# Patient Record
Sex: MALE | Race: WHITE | Employment: UNEMPLOYED | URBAN - METROPOLITAN AREA
[De-identification: names, ages, dates, MRNs, and addresses within clinical notes are randomized per-mention and may not be internally consistent; named-entity substitution may affect disease eponyms.]

---

## 2019-08-04 ENCOUNTER — APPOINTMENT (OUTPATIENT)
Dept: GENERAL RADIOLOGY | Age: 44
End: 2019-08-04

## 2019-08-04 ENCOUNTER — HOSPITAL ENCOUNTER (EMERGENCY)
Age: 44
Discharge: HOME | End: 2019-08-05
Attending: EMERGENCY MEDICINE

## 2019-08-04 VITALS
HEIGHT: 74 IN | WEIGHT: 225 LBS | HEART RATE: 106 BPM | BODY MASS INDEX: 28.88 KG/M2 | TEMPERATURE: 98 F | DIASTOLIC BLOOD PRESSURE: 102 MMHG | OXYGEN SATURATION: 100 % | SYSTOLIC BLOOD PRESSURE: 138 MMHG | RESPIRATION RATE: 18 BRPM

## 2019-08-04 DIAGNOSIS — M62.838 SPASM OF MUSCLE: ICD-10-CM

## 2019-08-04 DIAGNOSIS — M25.511 ACUTE PAIN OF RIGHT SHOULDER: Primary | ICD-10-CM

## 2019-08-04 PROCEDURE — 73060 X-RAY EXAM OF HUMERUS: CPT

## 2019-08-04 PROCEDURE — 73030 X-RAY EXAM OF SHOULDER: CPT

## 2019-08-04 PROCEDURE — 99283 EMERGENCY DEPT VISIT LOW MDM: CPT

## 2019-08-04 PROCEDURE — 6370000000 HC RX 637 (ALT 250 FOR IP): Performed by: EMERGENCY MEDICINE

## 2019-08-04 RX ORDER — ACETAMINOPHEN 500 MG
1000 TABLET ORAL ONCE
Status: COMPLETED | OUTPATIENT
Start: 2019-08-04 | End: 2019-08-04

## 2019-08-04 RX ADMIN — ACETAMINOPHEN 1000 MG: 500 TABLET ORAL at 23:38

## 2019-08-04 SDOH — HEALTH STABILITY: MENTAL HEALTH: HOW OFTEN DO YOU HAVE A DRINK CONTAINING ALCOHOL?: NEVER

## 2019-08-04 ASSESSMENT — PAIN DESCRIPTION - LOCATION: LOCATION: SHOULDER

## 2019-08-04 ASSESSMENT — PAIN SCALES - GENERAL: PAINLEVEL_OUTOF10: 8

## 2019-08-04 ASSESSMENT — PAIN DESCRIPTION - PAIN TYPE: TYPE: ACUTE PAIN

## 2019-08-04 ASSESSMENT — PAIN DESCRIPTION - DESCRIPTORS: DESCRIPTORS: BURNING

## 2019-08-04 ASSESSMENT — PAIN DESCRIPTION - ORIENTATION: ORIENTATION: RIGHT

## 2019-08-04 NOTE — LETTER
[address] [facility]  [facility] [address]  Phone: [phone]               August 5, 2019    Patient: Duane Copper   YOB: 1975   Date of Visit: 8/4/2019       To Whom It May Concern:    Gera Castillo was seen and treated in our emergency department on 8/4/2019. He may return to work on 8/7/19.       Sincerely,       Lauren Alejandro MD         Signature:__________________________________

## 2019-08-05 PROCEDURE — 6370000000 HC RX 637 (ALT 250 FOR IP): Performed by: EMERGENCY MEDICINE

## 2019-08-05 RX ORDER — LIDOCAINE 50 MG/G
1 PATCH TOPICAL DAILY PRN
Qty: 14 PATCH | Refills: 0 | Status: SHIPPED | OUTPATIENT
Start: 2019-08-05 | End: 2019-08-19

## 2019-08-05 RX ORDER — CYCLOBENZAPRINE HCL 10 MG
10 TABLET ORAL 3 TIMES DAILY PRN
Qty: 30 TABLET | Refills: 0 | Status: SHIPPED | OUTPATIENT
Start: 2019-08-05 | End: 2019-08-15

## 2019-08-05 RX ORDER — CYCLOBENZAPRINE HCL 10 MG
10 TABLET ORAL ONCE
Status: COMPLETED | OUTPATIENT
Start: 2019-08-05 | End: 2019-08-05

## 2019-08-05 RX ADMIN — CYCLOBENZAPRINE HYDROCHLORIDE 10 MG: 10 TABLET, FILM COATED ORAL at 00:05

## 2021-03-05 ENCOUNTER — HOSPITAL ENCOUNTER (EMERGENCY)
Age: 46
Discharge: HOME | End: 2021-03-05
Attending: EMERGENCY MEDICINE
Payer: COMMERCIAL

## 2021-03-05 VITALS
DIASTOLIC BLOOD PRESSURE: 97 MMHG | WEIGHT: 228 LBS | OXYGEN SATURATION: 99 % | HEART RATE: 92 BPM | TEMPERATURE: 98.4 F | RESPIRATION RATE: 18 BRPM | BODY MASS INDEX: 29.26 KG/M2 | HEIGHT: 74 IN | SYSTOLIC BLOOD PRESSURE: 145 MMHG

## 2021-03-05 DIAGNOSIS — R11.2 NAUSEA AND VOMITING, INTRACTABILITY OF VOMITING NOT SPECIFIED, UNSPECIFIED VOMITING TYPE: Primary | ICD-10-CM

## 2021-03-05 LAB
A/G RATIO: 1.3 (ref 1.1–2.2)
ALBUMIN SERPL-MCNC: 3.9 G/DL (ref 3.4–5)
ALP BLD-CCNC: 86 U/L (ref 40–129)
ALT SERPL-CCNC: 38 U/L (ref 10–40)
ANION GAP SERPL CALCULATED.3IONS-SCNC: 8 MMOL/L (ref 3–16)
AST SERPL-CCNC: 39 U/L (ref 15–37)
BASOPHILS ABSOLUTE: 0 K/UL (ref 0–0.2)
BASOPHILS RELATIVE PERCENT: 0.5 %
BILIRUB SERPL-MCNC: 0.4 MG/DL (ref 0–1)
BILIRUBIN URINE: NEGATIVE
BLOOD, URINE: NEGATIVE
BUN BLDV-MCNC: 10 MG/DL (ref 7–20)
CALCIUM SERPL-MCNC: 9.4 MG/DL (ref 8.3–10.6)
CHLORIDE BLD-SCNC: 103 MMOL/L (ref 99–110)
CLARITY: CLEAR
CO2: 28 MMOL/L (ref 21–32)
COLOR: YELLOW
CREAT SERPL-MCNC: 0.8 MG/DL (ref 0.9–1.3)
EOSINOPHILS ABSOLUTE: 0.1 K/UL (ref 0–0.6)
EOSINOPHILS RELATIVE PERCENT: 1.9 %
GFR AFRICAN AMERICAN: >60
GFR NON-AFRICAN AMERICAN: >60
GLOBULIN: 3 G/DL
GLUCOSE BLD-MCNC: 118 MG/DL (ref 70–99)
GLUCOSE URINE: NEGATIVE MG/DL
HCT VFR BLD CALC: 42.5 % (ref 40.5–52.5)
HEMOGLOBIN: 14.5 G/DL (ref 13.5–17.5)
KETONES, URINE: NEGATIVE MG/DL
LEUKOCYTE ESTERASE, URINE: NEGATIVE
LIPASE: 36 U/L (ref 13–60)
LYMPHOCYTES ABSOLUTE: 2.1 K/UL (ref 1–5.1)
LYMPHOCYTES RELATIVE PERCENT: 33.9 %
MCH RBC QN AUTO: 29.7 PG (ref 26–34)
MCHC RBC AUTO-ENTMCNC: 34.1 G/DL (ref 31–36)
MCV RBC AUTO: 87.1 FL (ref 80–100)
MICROSCOPIC EXAMINATION: NORMAL
MONOCYTES ABSOLUTE: 0.6 K/UL (ref 0–1.3)
MONOCYTES RELATIVE PERCENT: 9.2 %
NEUTROPHILS ABSOLUTE: 3.4 K/UL (ref 1.7–7.7)
NEUTROPHILS RELATIVE PERCENT: 54.5 %
NITRITE, URINE: NEGATIVE
PDW BLD-RTO: 13.6 % (ref 12.4–15.4)
PH UA: 7.5 (ref 5–8)
PLATELET # BLD: 246 K/UL (ref 135–450)
PMV BLD AUTO: 7.8 FL (ref 5–10.5)
POTASSIUM REFLEX MAGNESIUM: 3.8 MMOL/L (ref 3.5–5.1)
PROTEIN UA: NEGATIVE MG/DL
RBC # BLD: 4.87 M/UL (ref 4.2–5.9)
SODIUM BLD-SCNC: 139 MMOL/L (ref 136–145)
SPECIFIC GRAVITY UA: 1.01 (ref 1–1.03)
TOTAL PROTEIN: 6.9 G/DL (ref 6.4–8.2)
URINE TYPE: NORMAL
UROBILINOGEN, URINE: 0.2 E.U./DL
WBC # BLD: 6.3 K/UL (ref 4–11)

## 2021-03-05 PROCEDURE — 99284 EMERGENCY DEPT VISIT MOD MDM: CPT

## 2021-03-05 PROCEDURE — 2580000003 HC RX 258: Performed by: EMERGENCY MEDICINE

## 2021-03-05 PROCEDURE — 80053 COMPREHEN METABOLIC PANEL: CPT

## 2021-03-05 PROCEDURE — 81003 URINALYSIS AUTO W/O SCOPE: CPT

## 2021-03-05 PROCEDURE — 96374 THER/PROPH/DIAG INJ IV PUSH: CPT

## 2021-03-05 PROCEDURE — 83690 ASSAY OF LIPASE: CPT

## 2021-03-05 PROCEDURE — 85025 COMPLETE CBC W/AUTO DIFF WBC: CPT

## 2021-03-05 PROCEDURE — 6360000002 HC RX W HCPCS: Performed by: EMERGENCY MEDICINE

## 2021-03-05 PROCEDURE — 36415 COLL VENOUS BLD VENIPUNCTURE: CPT

## 2021-03-05 RX ORDER — ONDANSETRON 2 MG/ML
4 INJECTION INTRAMUSCULAR; INTRAVENOUS ONCE
Status: COMPLETED | OUTPATIENT
Start: 2021-03-05 | End: 2021-03-05

## 2021-03-05 RX ORDER — ONDANSETRON 4 MG/1
4 TABLET, ORALLY DISINTEGRATING ORAL EVERY 8 HOURS PRN
Qty: 20 TABLET | Refills: 0 | Status: SHIPPED | OUTPATIENT
Start: 2021-03-05 | End: 2021-03-12

## 2021-03-05 RX ORDER — 0.9 % SODIUM CHLORIDE 0.9 %
1000 INTRAVENOUS SOLUTION INTRAVENOUS ONCE
Status: COMPLETED | OUTPATIENT
Start: 2021-03-05 | End: 2021-03-05

## 2021-03-05 RX ADMIN — SODIUM CHLORIDE 1000 ML: 9 INJECTION, SOLUTION INTRAVENOUS at 20:18

## 2021-03-05 RX ADMIN — ONDANSETRON HYDROCHLORIDE 4 MG: 2 INJECTION, SOLUTION INTRAMUSCULAR; INTRAVENOUS at 20:18

## 2021-03-05 ASSESSMENT — PAIN DESCRIPTION - DESCRIPTORS
DESCRIPTORS: ACHING;BURNING
DESCRIPTORS: BURNING

## 2021-03-05 ASSESSMENT — PAIN DESCRIPTION - LOCATION: LOCATION: ABDOMEN

## 2021-03-05 ASSESSMENT — PAIN DESCRIPTION - PAIN TYPE: TYPE: ACUTE PAIN

## 2021-03-05 NOTE — LETTER
[address] [facility]  [facility] [address]  Phone: [phone]         March 5, 2021     Patient: Haile Davidson   YOB: 1975   Date of Visit: 3/5/2021       To Whom It May Concern:    Carmen Feliciano was seen and treated in our emergency department on 3/5/2021. The following work duties are recommended. He may return to full duty immediately with no restrictions    Treatment and work recommendations given by the emergency department are initial emergency measures only, and follow up should be arranged as soon as possible with the company's occupational health provider* or the specialist to whom the worker was referred.     *If the company does not have an occupational health provider, follow up should be at With your primary care physician if you do not have 1 please get 1      As needed          Sincerely,        Jessica MARIE, RN        Signature:__________________________________

## 2021-03-06 NOTE — DISCHARGE INSTR - COC
Conduit: {YES / XP:03984}       Date of Last BM: ***  No intake or output data in the 24 hours ending 21 2244  No intake/output data recorded.     Safety Concerns:     [address] KOO Safety Concerns:881361674}    Impairments/Disabilities:      [address] Nunez HANANE Impairments/Disabilities:918317345}    Nutrition Therapy:  Current Nutrition Therapy:   [address] Nunez [facility] Diet List:054422985}    Routes of Feeding: {CHP DME Other Feedings:331248156}  Liquids: {Slp liquid thickness:07951}  Daily Fluid Restriction: {CHP DME Yes amt example:994435431}  Last Modified Barium Swallow with Video (Video Swallowing Test): {Done Not Done ZPSO:078763893}    Treatments at the Time of Hospital Discharge:   Respiratory Treatments: ***  Oxygen Therapy:  {Therapy; copd oxygen:98708}  Ventilator:    {MH CC Vent FCDD:433460849}    Rehab Therapies: {THERAPEUTIC INTERVENTION:6772437641}  Weight Bearing Status/Restrictions: [facility] Anabel Nunez  Weight Bearin}  Other Medical Equipment (for information only, NOT a DME order):  {EQUIPMENT:579766301}  Other Treatments: ***    Patient's personal belongings (please select all that are sent with patient):  {[facility] DME Belongings:357889955}    RN SIGNATURE:  {Esignature:242262907}    CASE MANAGEMENT/SOCIAL WORK SECTION    Inpatient Status Date: ***    Readmission Risk Assessment Score:  Readmission Risk              Risk of Unplanned Readmission:        0           Discharging to Facility/ Agency   · Name:   · Address:  · Phone:  · Fax:    Dialysis Facility (if applicable)   · Name:  · Address:  · Dialysis Schedule:  · Phone:  · Fax:    / signature: {Esignature:140430048}    PHYSICIAN SECTION    Prognosis: {Prognosis:5396120798}    Condition at Discharge: 50Grzegorz Nunez Patient Condition:086382074}    Rehab Potential (if transferring to Rehab): {Prognosis:7678918590}    Recommended Labs or Other Treatments After Discharge: ***    Physician Certification: I certify the above information and transfer of Vega Phlegm

## 2021-03-06 NOTE — ED PROVIDER NOTES
Emergency Department Attending Note    Lora Arteaga MD    Date of ED VIsit: 3/5/2021    CHIEF COMPLAINT  Nausea (N/V and fever for a few days. )      HISTORY OF PRESENT ILLNESS  Olegario Marie is a 42 y.o. male  With Vital signs of BP (!) 159/103   Pulse 108   Temp 98.3 °F (36.8 °C) (Oral)   Resp 16   Ht 6' 2\" (1.88 m)   Wt 228 lb (103.4 kg)   SpO2 99%   BMI 29.27 kg/m²  who presents to the ED with a complaint of nausea and vomiting x2 days. Patient seen and evaluated in room 4. Patient states that everything he eats he vomits. Over the past couple of days. There is been no sick contacts. He said he is also had a fever up to 101. There is been no diarrhea no blood in his stool no blood in his vomitus. No abdominal pain. No urinary tract symptoms. No chest pain no shortness of breath no neurologic symptoms. .  No other complaints, modifying factors or associated symptoms. Patients Past medical history reviewed and listed below  Past Medical History:   Diagnosis Date    Murmur      Past Surgical History:   Procedure Laterality Date    OTHER SURGICAL HISTORY      \"open heart surgery as a kid\"        I have reviewed the following from the nursing documentation. History reviewed. No pertinent family history.   Social History     Socioeconomic History    Marital status:      Spouse name: Not on file    Number of children: Not on file    Years of education: Not on file    Highest education level: Not on file   Occupational History    Not on file   Social Needs    Financial resource strain: Not on file    Food insecurity     Worry: Not on file     Inability: Not on file    Transportation needs     Medical: Not on file     Non-medical: Not on file   Tobacco Use    Smoking status: Current Every Day Smoker     Packs/day: 2.00     Types: Cigarettes    Smokeless tobacco: Never Used   Substance and Sexual Activity    Alcohol use: Never     Frequency: Never    Drug use: Never    Sexual activity: Not on file   Lifestyle    Physical activity     Days per week: Not on file     Minutes per session: Not on file    Stress: Not on file   Relationships    Social connections     Talks on phone: Not on file     Gets together: Not on file     Attends [employer]an service: Not on file     Active member of club or organization: Not on file     Attends meetings of clubs or organizations: Not on file     Relationship status: Not on file    Intimate partner violence     Fear of current or ex partner: Not on file     Emotionally abused: Not on file     Physically abused: Not on file     Forced sexual activity: Not on file   Other Topics Concern    Not on file   Social History Narrative    Not on file     No current facility-administered medications for this encounter. No current outpatient medications on file. Allergies   Allergen Reactions    Ibuprofen      \"stop breathing\"        REVIEW OF SYSTEMS  10 systems reviewed, pertinent positives per HPI otherwise noted to be negative     PHYSICAL EXAM  BP (!) 159/103   Pulse 108   Temp 98.3 °F (36.8 °C) (Oral)   Resp 16   Ht 6' 2\" (1.88 m)   Wt 228 lb (103.4 kg)   SpO2 99%   BMI 29.27 kg/m²   GENERAL APPEARANCE: Awake and alert. Cooperative. In no obvious distress. HEAD: Normocephalic. Atraumatic. EYES: PERRL. EOM's grossly intact. ENT: Mucous membranes are pink and moist.   NECK: Supple. HEART: RRR. No murmurs. LUNGS: Respirations unlabored. CTAB. Good air exchange. ABDOMEN: Soft. Non-distended. Non-tender. No masses. No organomegaly. No guarding or rebound. No point tenderness on abdominal exam  EXTREMITIES: No peripheral edema. Moves all extremities equally. All extremities neurovascularly intact. SKIN: Warm and dry. No acute rashes. NEUROLOGICAL: Alert and oriented. Strength 5/5, sensation intact. Gait normal.   PSYCHIATRIC: Normal mood and affect. No HI or SI expressed to me.     RADIOLOGY    If acquired see below     EKG: If acquired see below       ED COURSE/MDM        ED Course as of Mar 05 2208   Fri Mar 05, 2021   2034 CBC was essentially normal with a white count of 6.3 and H&H of 14 and 42 and a platelet count of 164   CBC Auto Differential:    WBC 6.3   RBC 4.87   Hemoglobin Quant 14.5   Hematocrit 42.5   MCV 87.1   MCH 29.7   MCHC 34.1   RDW 13.6   Platelet Count 985   MPV 7.8   Neutrophils % 54.5   Lymphocyte % 33.9   Monocytes % 9.2   Eosinophils % 1.9   Basophils % 0.5   Neutrophils Absolute 3.4   Lymphocytes Absolute 2.1   Monocytes Absolute 0.6   Eosinophils Absolute 0.1   Basophils Absolute 0.0 [DL]   2054 Comprehensive metabolic panel was normal except for glucose of 118. His GFR was greater than 60   Comprehensive Metabolic Panel w/ Reflex to MG(!):    Sodium 139   Potassium 3.8   Chloride 103   CO2 28   Anion Gap 8   Glucose 118(!)   BUN 10   Creatinine 0.8(!)   GFR Non- >60   GFR African American >60   Calcium 9.4   Total Protein 6.9   Albumin 3.9   Albumin/Globulin Ratio 1.3   Bilirubin 0.4   Alk Phos 86   ALT 38   AST 39(!)   Globulin 3.0 [DL]   2111 Lipase was normal at 36   Lipase:    Lipase 36.0 [DL]   2207 Patient's urinalysis was normal with negative ketones negative nitrite negative leukocyte esterase. So is not dehydrated. He has been rehydrated feels better so he will be discharged.    Urinalysis, reflex to microscopic:    Color, UA Yellow   Clarity, UA Clear   Glucose, UA Negative   Bilirubin, Urine Negative   Ketones, Urine Negative   Specific Gravity, UA 1.015   Blood, Urine Negative   pH, UA 7.5   Protein, UA Negative   Urobilinogen, Urine 0.2   Nitrite, Urine Negative   Leukocyte Esterase, Urine Negative   Microscopic Examination Not Indicated   Urine Type NotGiven [DL]      ED Course User Index  [DL] Alyce Bella MD       The ED course and plan were reviewed and results discussed with the patient    The patient understood and agreed with the Discharge/transfer

## 2021-03-06 NOTE — ED NOTES
Pt is alert and oriented, stable for discharge to home,    Pt received printed and verbal instructions for self care at home    Pain rate still 6 per pt, but no more episodes of nausea or vomiting since receiving zofran          Jim Karl, DALE  03/05/21 9075

## 2022-01-28 ENCOUNTER — HOSPITAL ENCOUNTER (OUTPATIENT)
Age: 47
Setting detail: OBSERVATION
Discharge: HOME | End: 2022-02-01
Attending: STUDENT IN AN ORGANIZED HEALTH CARE EDUCATION/TRAINING PROGRAM | Admitting: INTERNAL MEDICINE
Payer: COMMERCIAL

## 2022-01-28 ENCOUNTER — APPOINTMENT (OUTPATIENT)
Dept: GENERAL RADIOLOGY | Age: 47
End: 2022-01-28
Payer: COMMERCIAL

## 2022-01-28 ENCOUNTER — APPOINTMENT (OUTPATIENT)
Dept: CT IMAGING | Age: 47
End: 2022-01-28
Payer: COMMERCIAL

## 2022-01-28 DIAGNOSIS — R07.9 CHEST PAIN, UNSPECIFIED TYPE: Primary | ICD-10-CM

## 2022-01-28 DIAGNOSIS — I47.1 SVT (SUPRAVENTRICULAR TACHYCARDIA) (HCC): ICD-10-CM

## 2022-01-28 LAB
A/G RATIO: 1.7 (ref 1.1–2.2)
ALBUMIN SERPL-MCNC: 4.5 G/DL (ref 3.4–5)
ALP BLD-CCNC: 79 U/L (ref 40–129)
ALT SERPL-CCNC: 18 U/L (ref 10–40)
AMPHETAMINE SCREEN, URINE: NORMAL
ANION GAP SERPL CALCULATED.3IONS-SCNC: 15 MMOL/L (ref 3–16)
AST SERPL-CCNC: 24 U/L (ref 15–37)
BARBITURATE SCREEN URINE: NORMAL
BASOPHILS ABSOLUTE: 0 K/UL (ref 0–0.2)
BASOPHILS RELATIVE PERCENT: 0.4 %
BENZODIAZEPINE SCREEN, URINE: NORMAL
BILIRUB SERPL-MCNC: 0.4 MG/DL (ref 0–1)
BILIRUBIN URINE: NEGATIVE
BLOOD, URINE: NEGATIVE
BUN BLDV-MCNC: 13 MG/DL (ref 7–20)
CALCIUM SERPL-MCNC: 9.5 MG/DL (ref 8.3–10.6)
CANNABINOID SCREEN URINE: NORMAL
CHLORIDE BLD-SCNC: 108 MMOL/L (ref 99–110)
CLARITY: CLEAR
CO2: 22 MMOL/L (ref 21–32)
COCAINE METABOLITE SCREEN URINE: NORMAL
COLOR: YELLOW
CREAT SERPL-MCNC: 0.8 MG/DL (ref 0.9–1.3)
D DIMER: 240 NG/ML DDU (ref 0–229)
EOSINOPHILS ABSOLUTE: 0 K/UL (ref 0–0.6)
EOSINOPHILS RELATIVE PERCENT: 0.2 %
GFR AFRICAN AMERICAN: >60
GFR NON-AFRICAN AMERICAN: >60
GLUCOSE BLD-MCNC: 102 MG/DL (ref 70–99)
GLUCOSE URINE: NEGATIVE MG/DL
HCT VFR BLD CALC: 45.4 % (ref 40.5–52.5)
HEMOGLOBIN: 15 G/DL (ref 13.5–17.5)
KETONES, URINE: NEGATIVE MG/DL
LEUKOCYTE ESTERASE, URINE: NEGATIVE
LYMPHOCYTES ABSOLUTE: 2.3 K/UL (ref 1–5.1)
LYMPHOCYTES RELATIVE PERCENT: 36.2 %
Lab: NORMAL
MCH RBC QN AUTO: 28.7 PG (ref 26–34)
MCHC RBC AUTO-ENTMCNC: 33 G/DL (ref 31–36)
MCV RBC AUTO: 87 FL (ref 80–100)
METHADONE SCREEN, URINE: NORMAL
MICROSCOPIC EXAMINATION: NORMAL
MONOCYTES ABSOLUTE: 0.6 K/UL (ref 0–1.3)
MONOCYTES RELATIVE PERCENT: 9 %
NEUTROPHILS ABSOLUTE: 3.4 K/UL (ref 1.7–7.7)
NEUTROPHILS RELATIVE PERCENT: 54.2 %
NITRITE, URINE: NEGATIVE
OPIATE SCREEN URINE: NORMAL
OXYCODONE URINE: NORMAL
PDW BLD-RTO: 15 % (ref 12.4–15.4)
PH UA: 7.5
PH UA: 7.5 (ref 5–8)
PHENCYCLIDINE SCREEN URINE: NORMAL
PLATELET # BLD: 229 K/UL (ref 135–450)
PMV BLD AUTO: 8.6 FL (ref 5–10.5)
POTASSIUM REFLEX MAGNESIUM: 3.6 MMOL/L (ref 3.5–5.1)
PROPOXYPHENE SCREEN: NORMAL
PROTEIN UA: NEGATIVE MG/DL
RBC # BLD: 5.21 M/UL (ref 4.2–5.9)
SODIUM BLD-SCNC: 145 MMOL/L (ref 136–145)
SPECIFIC GRAVITY UA: 1.01 (ref 1–1.03)
TOTAL PROTEIN: 7.2 G/DL (ref 6.4–8.2)
TROPONIN: <0.01 NG/ML
TROPONIN: <0.01 NG/ML
URINE TYPE: NORMAL
UROBILINOGEN, URINE: 0.2 E.U./DL
WBC # BLD: 6.3 K/UL (ref 4–11)

## 2022-01-28 PROCEDURE — G0378 HOSPITAL OBSERVATION PER HR: HCPCS

## 2022-01-28 PROCEDURE — 71260 CT THORAX DX C+: CPT

## 2022-01-28 PROCEDURE — 80307 DRUG TEST PRSMV CHEM ANLYZR: CPT

## 2022-01-28 PROCEDURE — 80053 COMPREHEN METABOLIC PANEL: CPT

## 2022-01-28 PROCEDURE — 6360000002 HC RX W HCPCS: Performed by: PHYSICIAN ASSISTANT

## 2022-01-28 PROCEDURE — 84484 ASSAY OF TROPONIN QUANT: CPT

## 2022-01-28 PROCEDURE — 99284 EMERGENCY DEPT VISIT MOD MDM: CPT

## 2022-01-28 PROCEDURE — 6360000004 HC RX CONTRAST MEDICATION: Performed by: PHYSICIAN ASSISTANT

## 2022-01-28 PROCEDURE — 85379 FIBRIN DEGRADATION QUANT: CPT

## 2022-01-28 PROCEDURE — 93005 ELECTROCARDIOGRAM TRACING: CPT | Performed by: STUDENT IN AN ORGANIZED HEALTH CARE EDUCATION/TRAINING PROGRAM

## 2022-01-28 PROCEDURE — 81003 URINALYSIS AUTO W/O SCOPE: CPT

## 2022-01-28 PROCEDURE — 71045 X-RAY EXAM CHEST 1 VIEW: CPT

## 2022-01-28 PROCEDURE — 6370000000 HC RX 637 (ALT 250 FOR IP): Performed by: INTERNAL MEDICINE

## 2022-01-28 PROCEDURE — 85025 COMPLETE CBC W/AUTO DIFF WBC: CPT

## 2022-01-28 PROCEDURE — 36415 COLL VENOUS BLD VENIPUNCTURE: CPT

## 2022-01-28 PROCEDURE — 96372 THER/PROPH/DIAG INJ SC/IM: CPT

## 2022-01-28 PROCEDURE — 2580000003 HC RX 258: Performed by: INTERNAL MEDICINE

## 2022-01-28 PROCEDURE — 96374 THER/PROPH/DIAG INJ IV PUSH: CPT

## 2022-01-28 RX ORDER — SODIUM CHLORIDE 0.9 % (FLUSH) 0.9 %
5-40 SYRINGE (ML) INJECTION EVERY 12 HOURS SCHEDULED
Status: DISCONTINUED | OUTPATIENT
Start: 2022-01-28 | End: 2022-02-01 | Stop reason: HOSPADM

## 2022-01-28 RX ORDER — POLYETHYLENE GLYCOL 3350 17 G/17G
17 POWDER, FOR SOLUTION ORAL DAILY PRN
Status: DISCONTINUED | OUTPATIENT
Start: 2022-01-28 | End: 2022-02-01 | Stop reason: HOSPADM

## 2022-01-28 RX ORDER — ONDANSETRON 2 MG/ML
4 INJECTION INTRAMUSCULAR; INTRAVENOUS EVERY 6 HOURS PRN
Status: DISCONTINUED | OUTPATIENT
Start: 2022-01-28 | End: 2022-02-01 | Stop reason: HOSPADM

## 2022-01-28 RX ORDER — MORPHINE SULFATE 4 MG/ML
4 INJECTION, SOLUTION INTRAMUSCULAR; INTRAVENOUS ONCE
Status: COMPLETED | OUTPATIENT
Start: 2022-01-28 | End: 2022-01-28

## 2022-01-28 RX ORDER — ACETAMINOPHEN 325 MG/1
650 TABLET ORAL EVERY 6 HOURS PRN
Status: DISCONTINUED | OUTPATIENT
Start: 2022-01-28 | End: 2022-02-01 | Stop reason: HOSPADM

## 2022-01-28 RX ORDER — SODIUM CHLORIDE 9 MG/ML
25 INJECTION, SOLUTION INTRAVENOUS PRN
Status: DISCONTINUED | OUTPATIENT
Start: 2022-01-28 | End: 2022-02-01 | Stop reason: HOSPADM

## 2022-01-28 RX ORDER — CLONAZEPAM 1 MG/1
1 TABLET ORAL 2 TIMES DAILY PRN
Status: DISCONTINUED | OUTPATIENT
Start: 2022-01-28 | End: 2022-02-01 | Stop reason: HOSPADM

## 2022-01-28 RX ORDER — ACETAMINOPHEN 650 MG/1
650 SUPPOSITORY RECTAL EVERY 6 HOURS PRN
Status: DISCONTINUED | OUTPATIENT
Start: 2022-01-28 | End: 2022-02-01 | Stop reason: HOSPADM

## 2022-01-28 RX ORDER — SODIUM CHLORIDE 0.9 % (FLUSH) 0.9 %
5-40 SYRINGE (ML) INJECTION PRN
Status: DISCONTINUED | OUTPATIENT
Start: 2022-01-28 | End: 2022-02-01 | Stop reason: HOSPADM

## 2022-01-28 RX ORDER — SENNA PLUS 8.6 MG/1
1 TABLET ORAL NIGHTLY
Status: DISCONTINUED | OUTPATIENT
Start: 2022-01-28 | End: 2022-02-01 | Stop reason: HOSPADM

## 2022-01-28 RX ORDER — ATORVASTATIN CALCIUM 40 MG/1
40 TABLET, FILM COATED ORAL NIGHTLY
Status: DISCONTINUED | OUTPATIENT
Start: 2022-01-28 | End: 2022-01-30

## 2022-01-28 RX ORDER — ONDANSETRON 4 MG/1
4 TABLET, ORALLY DISINTEGRATING ORAL EVERY 8 HOURS PRN
Status: DISCONTINUED | OUTPATIENT
Start: 2022-01-28 | End: 2022-02-01 | Stop reason: HOSPADM

## 2022-01-28 RX ORDER — CLONAZEPAM 1 MG/1
1 TABLET ORAL 2 TIMES DAILY PRN
COMMUNITY

## 2022-01-28 RX ORDER — CLOPIDOGREL BISULFATE 75 MG/1
75 TABLET ORAL DAILY
Status: DISCONTINUED | OUTPATIENT
Start: 2022-01-29 | End: 2022-01-29

## 2022-01-28 RX ORDER — NITROGLYCERIN 0.4 MG/1
0.4 TABLET SUBLINGUAL EVERY 5 MIN PRN
Status: COMPLETED | OUTPATIENT
Start: 2022-01-28 | End: 2022-01-29

## 2022-01-28 RX ADMIN — MORPHINE SULFATE 4 MG: 4 INJECTION INTRAVENOUS at 16:23

## 2022-01-28 RX ADMIN — CLONAZEPAM 1 MG: 1 TABLET ORAL at 22:48

## 2022-01-28 RX ADMIN — SODIUM CHLORIDE, PRESERVATIVE FREE 10 ML: 5 INJECTION INTRAVENOUS at 22:57

## 2022-01-28 RX ADMIN — NITROGLYCERIN 0.4 MG: 0.4 TABLET, ORALLY DISINTEGRATING SUBLINGUAL at 22:56

## 2022-01-28 RX ADMIN — ACETAMINOPHEN 650 MG: 325 TABLET ORAL at 22:48

## 2022-01-28 RX ADMIN — ATORVASTATIN CALCIUM 40 MG: 40 TABLET, FILM COATED ORAL at 22:48

## 2022-01-28 RX ADMIN — NITROGLYCERIN 0.4 MG: 0.4 TABLET, ORALLY DISINTEGRATING SUBLINGUAL at 23:01

## 2022-01-28 RX ADMIN — IOPAMIDOL 75 ML: 755 INJECTION, SOLUTION INTRAVENOUS at 17:36

## 2022-01-28 ASSESSMENT — ENCOUNTER SYMPTOMS
COLOR CHANGE: 0
VOMITING: 0
BACK PAIN: 0
NAUSEA: 0
SHORTNESS OF BREATH: 0
EYES NEGATIVE: 1
ABDOMINAL PAIN: 0
COUGH: 0

## 2022-01-28 ASSESSMENT — PAIN DESCRIPTION - FREQUENCY: FREQUENCY: CONTINUOUS

## 2022-01-28 ASSESSMENT — HEART SCORE: ECG: 0

## 2022-01-28 ASSESSMENT — PAIN DESCRIPTION - DESCRIPTORS
DESCRIPTORS: CRUSHING;PRESSURE
DESCRIPTORS: SHARP

## 2022-01-28 ASSESSMENT — PAIN DESCRIPTION - LOCATION: LOCATION: CHEST

## 2022-01-28 ASSESSMENT — PAIN DESCRIPTION - ONSET: ONSET: ON-GOING

## 2022-01-28 ASSESSMENT — PAIN SCALES - GENERAL
PAINLEVEL_OUTOF10: 7
PAINLEVEL_OUTOF10: 8
PAINLEVEL_OUTOF10: 7
PAINLEVEL_OUTOF10: 7
PAINLEVEL_OUTOF10: 5

## 2022-01-28 ASSESSMENT — PAIN DESCRIPTION - PROGRESSION: CLINICAL_PROGRESSION: NOT CHANGED

## 2022-01-28 ASSESSMENT — PAIN DESCRIPTION - PAIN TYPE
TYPE: ACUTE PAIN
TYPE: ACUTE PAIN

## 2022-01-28 ASSESSMENT — PAIN DESCRIPTION - ORIENTATION: ORIENTATION: MID

## 2022-01-28 NOTE — ED PROVIDER NOTES
[address] [facility]  ED  EMERGENCY DEPARTMENT ENCOUNTER        Pt Name: Lorena Frost  MRN: [id number]  Javygfmarii 1975  Date of evaluation: 1/28/2022  Provider: Sumaya Lamar PA-C  PCP: No primary care provider on file. ED Attending: Erum Currie MD      This patient was seen by the attending provider Erum Currie MD    History provided by the patient    CHIEF COMPLAINT:     Chief Complaint   Patient presents with    Tachycardia     Per EMS pt from [address] cp started Susan Sunflower couple of days ago\" today felt like \"I was going to have a panic attack\" nurse called EMS, pt was in SVT, 6 adenosine administered, converted back to nsr. Pt given 1 nitro and 50 mcg fentanyl en route for the pain.  Chest Pain       HISTORY OF PRESENT ILLNESS:      Lorena Frost is a 42 y.o. male who arrives to the ED by EMS from [facility]. This patient started having left-sided chest pain, below his armpit within the last 2 to 3 days. He actually had an outpatient chest x-ray at the [facility] that was reported to be normal.  Today, he was laying on his bed when he started experiencing his heart racing. He felt like he was having a panic attack. He tried to walk it off in his cell before ultimately calling for help. Patient was found to be tachycardic as high as 220 bpm.  EMS were called. EMS administered adenosine 6 mg which converted the patient back to normal rate. Additionally, patient was given aspirin, sublingual nitro x1 and fentanyl 50 mcg for chest pain. On arrival, patient is in sinus rhythm with normal rate. He continues to complain of chest pain but is not experiencing any dyspnea, palpitations or feeling anxious anymore. He has 1 pack a day smoker and reports a family history of heart disease. He denies any other significant medical history and he is not on any daily medicines. No identifiable exacerbating or alleviating factors to the chest pain.     Nursing Notes were reviewed REVIEW OF SYSTEMS:     Review of Systems   Constitutional: Negative for appetite change, chills and fever. HENT: Negative. Eyes: Negative. Respiratory: Negative for cough and shortness of breath. Cardiovascular: Positive for chest pain and palpitations (Prior to arrival, now resolved). Gastrointestinal: Negative for abdominal pain, nausea and vomiting. Genitourinary: Negative. Musculoskeletal: Negative for back pain and neck pain. Skin: Negative for color change. Neurological: Negative for dizziness and headaches. All other systems reviewed and are negative. Except as noted above in the ROS, all other systems were reviewed and negative. PAST MEDICAL HISTORY:     Past Medical History:   Diagnosis Date    Murmur          SURGICAL HISTORY:      Past Surgical History:   Procedure Laterality Date    OTHER SURGICAL HISTORY      \"open heart surgery as a kid\"          CURRENT MEDICATIONS:       Previous Medications    CLONAZEPAM (KLONOPIN) 1 MG TABLET    Take 1 mg by mouth 2 times daily as needed. ALLERGIES:    Ibuprofen    FAMILY HISTORY:     History reviewed. No pertinent family history.        SOCIAL HISTORY:       Social History     Socioeconomic History    Marital status:      Spouse name: None    Number of children: None    Years of education: None    Highest education level: None   Occupational History    None   Tobacco Use    Smoking status: Current Every Day Smoker     Packs/day: 1.00     Types: Cigarettes    Smokeless tobacco: Never Used   Vaping Use    Vaping Use: Never used   Substance and Sexual Activity    Alcohol use: Not Currently    Drug use: Not Currently    Sexual activity: None   Other Topics Concern    None   Social History Narrative    None     Social Determinants of Health     Financial Resource Strain:     Difficulty of Paying Living Expenses: Not on file   Food Insecurity:     Worried About Running Out of Food in the Last Year: Not on file    Ran Out of Food in the Last Year: Not on file   Transportation Needs:     Lack of Transportation (Medical): Not on file    Lack of Transportation (Non-Medical): Not on file   Physical Activity:     Days of Exercise per Week: Not on file    Minutes of Exercise per Session: Not on file   Stress:     Feeling of Stress : Not on file   Social Connections:     Frequency of Communication with Friends and Family: Not on file    Frequency of Social Gatherings with Friends and Family: Not on file    Attends Denominational Services: Not on file    Active Member of [address] [place] [address] [employer] or Organizations: Not on file    Attends Club or Organization Meetings: Not on file    Marital Status: Not on file   Intimate Partner Violence:     Fear of Current or Ex-Partner: Not on file    Emotionally Abused: Not on file    Physically Abused: Not on file    Sexually Abused: Not on file   Housing Stability:     Unable to Pay for Housing in the Last Year: Not on file    Number of [place] in the Last Year: Not on file    Unstable Housing in the Last Year: Not on file       SCREENINGS:             PHYSICAL EXAM:       ED Triage Vitals [01/28/22 1546]   BP Temp Temp Source Pulse Resp SpO2 Height Weight   130/88 98.9 °F (37.2 °C) Oral 97 18 100 % 6' 2\" (1.88 m) 228 lb (103.4 kg)       Physical Exam    CONSTITUTIONAL: Awake and alert. Cooperative. Well-developed. Well-nourished. Non-toxic. No acute distress. HENT: Normocephalic. Atraumatic. External ears normal, without discharge. No nasal discharge. Oropharynx clear. Mucous membranes moist.  EYES: Conjunctiva non-injected. No scleral icterus. PERRL. EOM's grossly intact. NECK: Supple. Normal ROM. CARDIOVASCULAR: RRR. No Murmer. Intact distal pulses. PULMONARY/CHEST WALL: Effort normal. No tachypnea. Lungs clear to ausculation. ABDOMEN: Normal BS. Soft. Nondistended. No tenderness to palpate. No guarding. /ANORECTAL: Not assessed  MUSKULOSKELETAL: Normal ROM.  No acute deformities. No edema. No tenderness to palpate. SKIN: Warm and dry. No rash. Extensive tattoos. NEUROLOGICAL: Alert and oriented x 3. GCS 15. CN II-XII grossly intact. Strength is 5/5 in all extremities and sensation is intact. Normal gait.    PSYCHIATRIC: Normal affect        DIAGNOSTICRESULTS:     LABS:    Results for orders placed or performed during the hospital encounter of 01/28/22   CBC Auto Differential   Result Value Ref Range    WBC 6.3 4.0 - 11.0 K/uL    RBC 5.21 4.20 - 5.90 M/uL    Hemoglobin 15.0 13.5 - 17.5 g/dL    Hematocrit 45.4 40.5 - 52.5 %    MCV 87.0 80.0 - 100.0 fL    MCH 28.7 26.0 - 34.0 pg    MCHC 33.0 31.0 - 36.0 g/dL    RDW 15.0 12.4 - 15.4 %    Platelets 409 370 - 418 K/uL    MPV 8.6 5.0 - 10.5 fL    Neutrophils % 54.2 %    Lymphocytes % 36.2 %    Monocytes % 9.0 %    Eosinophils % 0.2 %    Basophils % 0.4 %    Neutrophils Absolute 3.4 1.7 - 7.7 K/uL    Lymphocytes Absolute 2.3 1.0 - 5.1 K/uL    Monocytes Absolute 0.6 0.0 - 1.3 K/uL    Eosinophils Absolute 0.0 0.0 - 0.6 K/uL    Basophils Absolute 0.0 0.0 - 0.2 K/uL   Comprehensive Metabolic Panel w/ Reflex to MG   Result Value Ref Range    Sodium 145 136 - 145 mmol/L    Potassium reflex Magnesium 3.6 3.5 - 5.1 mmol/L    Chloride 108 99 - 110 mmol/L    CO2 22 21 - 32 mmol/L    Anion Gap 15 3 - 16    Glucose 102 (H) 70 - 99 mg/dL    BUN 13 7 - 20 mg/dL    CREATININE 0.8 (L) 0.9 - 1.3 mg/dL    GFR Non-African American >60 >60    GFR African American >60 >60    Calcium 9.5 8.3 - 10.6 mg/dL    Total Protein 7.2 6.4 - 8.2 g/dL    Albumin 4.5 3.4 - 5.0 g/dL    Albumin/Globulin Ratio 1.7 1.1 - 2.2    Total Bilirubin 0.4 0.0 - 1.0 mg/dL    Alkaline Phosphatase 79 40 - 129 U/L    ALT 18 10 - 40 U/L    AST 24 15 - 37 U/L   Troponin   Result Value Ref Range    Troponin <0.01 <0.01 ng/mL   Urinalysis, reflex to microscopic   Result Value Ref Range    Color, UA Yellow Straw/Yellow    Clarity, UA Clear Clear    Glucose, Ur Negative Negative mg/dL Bilirubin Urine Negative Negative    Ketones, Urine Negative Negative mg/dL    Specific Gravity, UA 1.010 1.005 - 1.030    Blood, Urine Negative Negative    pH, UA 7.5 5.0 - 8.0    Protein, UA Negative Negative mg/dL    Urobilinogen, Urine 0.2 <2.0 E.U./dL    Nitrite, Urine Negative Negative    Leukocyte Esterase, Urine Negative Negative    Microscopic Examination Not Indicated     Urine Type NotGiven    D-dimer, quantitative   Result Value Ref Range    D-Dimer, Quant 240 (H) 0 - 229 ng/mL DDU   Urine Drug Screen   Result Value Ref Range    pH, UA 7.5     Drug Screen Comment: see below    EKG 12 Lead   Result Value Ref Range    Ventricular Rate 87 BPM    Atrial Rate 87 BPM    P-R Interval 184 ms    QRS Duration 84 ms    Q-T Interval 366 ms    QTc Calculation (Bazett) 440 ms    P Axis 74 degrees    R Axis 101 degrees    T Axis 57 degrees    Diagnosis       Normal sinus rhythmRightward axisBorderline ECGWhen compared with ECG of 17-AUG-2004 17:51,Sinus rhythm has replaced Junctional rhythmBorderline criteria for Anterior infarct are no longer Present         RADIOLOGY:  All x-ray studies areviewed/reviewed by me. Formal interpretations per the radiologist are as follows:      XR CHEST PORTABLE    Result Date: 1/28/2022  EXAMINATION: ONE XRAY VIEW OF THE CHEST 1/28/2022 4:00 pm COMPARISON: None. HISTORY: ORDERING SYSTEM PROVIDED HISTORY: chest pain TECHNOLOGIST PROVIDED HISTORY: Reason for exam:->chest pain Reason for Exam: tachycardia FINDINGS: Cardiac silhouette and mediastinal contours are. There are multiple calcified granuloma. No pleural effusion or pneumothorax is identified. No acute cardiopulmonary disease. CT CHEST PULMONARY EMBOLISM W CONTRAST    Result Date: 1/28/2022  EXAMINATION: CTA OF THE CHEST 1/28/2022 5:23 pm TECHNIQUE: CTA of the chest was performed after the administration of intravenous contrast.  Multiplanar reformatted images are provided for review.   MIP images are provided for review. Dose modulation, iterative reconstruction, and/or weight based adjustment of the mA/kV was utilized to reduce the radiation dose to as low as reasonably achievable. COMPARISON: None. HISTORY: ORDERING SYSTEM PROVIDED HISTORY: chest pain, recent covid, bumped ddimer TECHNOLOGIST PROVIDED HISTORY: Reason for exam:->chest pain, recent covid, bumped ddimer Decision Support Exception - unselect if not a suspected or confirmed emergency medical condition->Emergency Medical Condition (MA) Reason for Exam: sob, tightness in chest, chest pain Relevant Medical/Surgical History: covid FINDINGS: Pulmonary Arteries: Pulmonary arteries are adequately opacified for evaluation. No evidence of intraluminal filling defect to suggest pulmonary embolism. Main pulmonary artery is normal in caliber. Mediastinum: No evidence of mediastinal lymphadenopathy. The heart and pericardium demonstrate no acute abnormality. There is no acute abnormality of the thoracic aorta. Lungs/pleura: No focal airspace disease is identified. There is no pleural effusion or pneumothorax. Upper Abdomen: The adrenal glands and upper abdomen are unremarkable. Soft Tissues/Bones: No acute bone or soft tissue abnormality. No acute pulmonary embolus is identified. No acute cardiopulmonary disease. EKG: The Ekg interpreted by me in the absence of a cardiologist shows.     normal sinus rhythm with a rate of 87    See also interpretation by Weston Palacios MD.      PROCEDURES:   N/A    CRITICAL CARE TIME:       None      CONSULTS:  None      EMERGENCY DEPARTMENT COURSE and DIFFERENTIAL DIAGNOSIS/MDM:   Vitals:    Vitals:    01/28/22 1546 01/28/22 1647   BP: 130/88 112/87   Pulse: 97 93   Resp: 18 17   Temp: 98.9 °F (37.2 °C)    TempSrc: Oral    SpO2: 100% 99%   Weight: 228 lb (103.4 kg)    Height: 6' 2\" (1.88 m)        Patient was given the following medications:  Medications   iopamidol (ISOVUE-370) 76 % injection 75 mL (has no administration in time range)   morphine sulfate (PF) injection 4 mg (4 mg IntraVENous Given 1/28/22 3441)         Patient was evaluated by both myself and Ivet Danielson MD.   Old records were reviewed. This patient was brought in by EMS. He reportedly had chest pain for couple days and today was feeling anxious and felt his heart racing. Patient ultimately found to be in SVT. EMS were called and administered adenosine which converted the patient. He really arrives to the ED with normal sinus rhythm. Normal rate. Work-up was initiated to evaluate his chest pain which is still present. EKG reveals normal sinus rhythm with rate 87 bpm  Portable chest x-ray no acute cardiopulmonary disease  CBC white count 6.3 with H&H 15.0 and 45.4  CMP unremarkable  Troponin negative  D-dimer slightly elevated at 240  Urinalysis normal  Urine drug screen pending  Because patient had an elevated D-dimer and reportedly had COVID earlier this month, I ordered CT chest.  This is being done. CT chest ultimately negative without PE or acute pulmonary abnormality. Patient was given morphine 4 mg IV for chest pain while undergoing work-up here in the ED. He has remained hemodynamically stable. Ultimately, the patient's SVT and ongoing chest pain are concerning given his family history and smoking. Heart score at this time is 2. Will plan to admit. Dr. Ml Soriano spoke with [facility] hospitalist. We thoroughly discussed the history, physical exam, laboratory and imaging studies, as well as, emergency department course. Based upon that discussion, we've decided to admit Rehan Vera for further observation and evaluation of Leonard Hatfield's chest pain.   As I have deemed necessary from their history, physical, and studies, I have considered and evaluated Rehan Vera for the following diagnoses:  ACUTE CORONARY SYNDROME, PERICARDIAL TAMPONADE, CHF, SEPSIS, COPD EXACERBATION, PNEUMONIA, PNEUMOTHORAX, PULMONARY EMBOLISM, and THORACIC DISSECTION. FINAL IMPRESSION:      1. Chest pain, unspecified type    2.  SVT (supraventricular tachycardia) ([facility] [address]          DISPOSITION/PLAN:   DISPOSITION     ADMIT                 (Please note thatportions of this note were completed with a voice recognition program.  Efforts were made to edit the dictations, but occasionally words are mis-transcribed.)    Marcy Vidales PA-C (electronicallysigned)              [facility], [address]  01/28/22 534 [facility], [address]  01/29/22 2848

## 2022-01-29 LAB
ANION GAP SERPL CALCULATED.3IONS-SCNC: 11 MMOL/L (ref 3–16)
BUN BLDV-MCNC: 12 MG/DL (ref 7–20)
CALCIUM SERPL-MCNC: 9 MG/DL (ref 8.3–10.6)
CHLORIDE BLD-SCNC: 105 MMOL/L (ref 99–110)
CHOLESTEROL, TOTAL: 140 MG/DL (ref 0–199)
CO2: 24 MMOL/L (ref 21–32)
CREAT SERPL-MCNC: 0.7 MG/DL (ref 0.9–1.3)
EKG ATRIAL RATE: 87 BPM
EKG DIAGNOSIS: NORMAL
EKG P AXIS: 74 DEGREES
EKG P-R INTERVAL: 184 MS
EKG Q-T INTERVAL: 366 MS
EKG QRS DURATION: 84 MS
EKG QTC CALCULATION (BAZETT): 440 MS
EKG R AXIS: 101 DEGREES
EKG T AXIS: 57 DEGREES
EKG VENTRICULAR RATE: 87 BPM
GFR AFRICAN AMERICAN: >60
GFR NON-AFRICAN AMERICAN: >60
GLUCOSE BLD-MCNC: 90 MG/DL (ref 70–99)
HCT VFR BLD CALC: 41.9 % (ref 40.5–52.5)
HDLC SERPL-MCNC: 44 MG/DL (ref 40–60)
HEMOGLOBIN: 14.2 G/DL (ref 13.5–17.5)
LDL CHOLESTEROL CALCULATED: 78 MG/DL
LV EF: 48 %
LVEF MODALITY: NORMAL
MCH RBC QN AUTO: 29 PG (ref 26–34)
MCHC RBC AUTO-ENTMCNC: 33.8 G/DL (ref 31–36)
MCV RBC AUTO: 85.9 FL (ref 80–100)
PDW BLD-RTO: 14.5 % (ref 12.4–15.4)
PLATELET # BLD: 214 K/UL (ref 135–450)
PMV BLD AUTO: 9.3 FL (ref 5–10.5)
POTASSIUM REFLEX MAGNESIUM: 3.6 MMOL/L (ref 3.5–5.1)
RBC # BLD: 4.88 M/UL (ref 4.2–5.9)
SODIUM BLD-SCNC: 140 MMOL/L (ref 136–145)
TRIGL SERPL-MCNC: 91 MG/DL (ref 0–150)
TROPONIN: <0.01 NG/ML
TROPONIN: <0.01 NG/ML
TSH REFLEX FT4: 0.69 UIU/ML (ref 0.27–4.2)
VLDLC SERPL CALC-MCNC: 18 MG/DL
WBC # BLD: 6.4 K/UL (ref 4–11)

## 2022-01-29 PROCEDURE — 96376 TX/PRO/DX INJ SAME DRUG ADON: CPT

## 2022-01-29 PROCEDURE — 99205 OFFICE O/P NEW HI 60 MIN: CPT | Performed by: INTERNAL MEDICINE

## 2022-01-29 PROCEDURE — 80061 LIPID PANEL: CPT

## 2022-01-29 PROCEDURE — 36415 COLL VENOUS BLD VENIPUNCTURE: CPT

## 2022-01-29 PROCEDURE — 6370000000 HC RX 637 (ALT 250 FOR IP): Performed by: HOSPITALIST

## 2022-01-29 PROCEDURE — 93306 TTE W/DOPPLER COMPLETE: CPT

## 2022-01-29 PROCEDURE — 84484 ASSAY OF TROPONIN QUANT: CPT

## 2022-01-29 PROCEDURE — 84443 ASSAY THYROID STIM HORMONE: CPT

## 2022-01-29 PROCEDURE — 93010 ELECTROCARDIOGRAM REPORT: CPT | Performed by: INTERNAL MEDICINE

## 2022-01-29 PROCEDURE — 80048 BASIC METABOLIC PNL TOTAL CA: CPT

## 2022-01-29 PROCEDURE — 6360000002 HC RX W HCPCS: Performed by: NURSE PRACTITIONER

## 2022-01-29 PROCEDURE — G0378 HOSPITAL OBSERVATION PER HR: HCPCS

## 2022-01-29 PROCEDURE — 6370000000 HC RX 637 (ALT 250 FOR IP): Performed by: INTERNAL MEDICINE

## 2022-01-29 PROCEDURE — 85027 COMPLETE CBC AUTOMATED: CPT

## 2022-01-29 PROCEDURE — 93005 ELECTROCARDIOGRAM TRACING: CPT | Performed by: HOSPITALIST

## 2022-01-29 PROCEDURE — 2580000003 HC RX 258: Performed by: INTERNAL MEDICINE

## 2022-01-29 RX ORDER — POTASSIUM CHLORIDE 750 MG/1
40 TABLET, EXTENDED RELEASE ORAL ONCE
Status: COMPLETED | OUTPATIENT
Start: 2022-01-29 | End: 2022-01-29

## 2022-01-29 RX ORDER — NICOTINE 21 MG/24HR
1 PATCH, TRANSDERMAL 24 HOURS TRANSDERMAL DAILY
Status: DISCONTINUED | OUTPATIENT
Start: 2022-01-29 | End: 2022-01-31 | Stop reason: SDUPTHER

## 2022-01-29 RX ORDER — MORPHINE SULFATE 2 MG/ML
1 INJECTION, SOLUTION INTRAMUSCULAR; INTRAVENOUS EVERY 4 HOURS PRN
Status: DISCONTINUED | OUTPATIENT
Start: 2022-01-29 | End: 2022-01-29

## 2022-01-29 RX ORDER — NITROGLYCERIN 0.4 MG/1
0.4 TABLET SUBLINGUAL EVERY 5 MIN PRN
Status: DISCONTINUED | OUTPATIENT
Start: 2022-01-29 | End: 2022-02-01 | Stop reason: HOSPADM

## 2022-01-29 RX ORDER — MORPHINE SULFATE 2 MG/ML
2 INJECTION, SOLUTION INTRAMUSCULAR; INTRAVENOUS EVERY 4 HOURS PRN
Status: DISCONTINUED | OUTPATIENT
Start: 2022-01-29 | End: 2022-01-30

## 2022-01-29 RX ORDER — MORPHINE SULFATE 2 MG/ML
2 INJECTION, SOLUTION INTRAMUSCULAR; INTRAVENOUS EVERY 4 HOURS PRN
Status: DISCONTINUED | OUTPATIENT
Start: 2022-01-29 | End: 2022-01-29

## 2022-01-29 RX ORDER — MORPHINE SULFATE 2 MG/ML
2 INJECTION, SOLUTION INTRAMUSCULAR; INTRAVENOUS ONCE
Status: COMPLETED | OUTPATIENT
Start: 2022-01-29 | End: 2022-01-29

## 2022-01-29 RX ADMIN — POTASSIUM CHLORIDE 40 MEQ: 10 TABLET, EXTENDED RELEASE ORAL at 09:11

## 2022-01-29 RX ADMIN — MORPHINE SULFATE 2 MG: 2 INJECTION, SOLUTION INTRAMUSCULAR; INTRAVENOUS at 02:55

## 2022-01-29 RX ADMIN — SODIUM CHLORIDE, PRESERVATIVE FREE 10 ML: 5 INJECTION INTRAVENOUS at 20:30

## 2022-01-29 RX ADMIN — NITROGLYCERIN 0.4 MG: 0.4 TABLET, ORALLY DISINTEGRATING SUBLINGUAL at 08:15

## 2022-01-29 RX ADMIN — SODIUM CHLORIDE, PRESERVATIVE FREE 10 ML: 5 INJECTION INTRAVENOUS at 07:24

## 2022-01-29 RX ADMIN — ACETAMINOPHEN 650 MG: 325 TABLET ORAL at 15:13

## 2022-01-29 RX ADMIN — CLONAZEPAM 1 MG: 1 TABLET ORAL at 10:38

## 2022-01-29 RX ADMIN — MORPHINE SULFATE 2 MG: 2 INJECTION, SOLUTION INTRAMUSCULAR; INTRAVENOUS at 15:12

## 2022-01-29 RX ADMIN — CLOPIDOGREL BISULFATE 75 MG: 75 TABLET ORAL at 09:11

## 2022-01-29 RX ADMIN — NITROGLYCERIN 0.4 MG: 0.4 TABLET, ORALLY DISINTEGRATING SUBLINGUAL at 15:42

## 2022-01-29 RX ADMIN — NITROGLYCERIN 0.4 MG: 0.4 TABLET, ORALLY DISINTEGRATING SUBLINGUAL at 15:28

## 2022-01-29 RX ADMIN — MORPHINE SULFATE 2 MG: 2 INJECTION, SOLUTION INTRAMUSCULAR; INTRAVENOUS at 22:33

## 2022-01-29 RX ADMIN — MORPHINE SULFATE 2 MG: 2 INJECTION, SOLUTION INTRAMUSCULAR; INTRAVENOUS at 07:23

## 2022-01-29 ASSESSMENT — PAIN DESCRIPTION - ONSET
ONSET: ON-GOING

## 2022-01-29 ASSESSMENT — PAIN DESCRIPTION - LOCATION
LOCATION: CHEST;NECK;SHOULDER
LOCATION: CHEST;NECK;SHOULDER
LOCATION: CHEST
LOCATION: CHEST;NECK;SHOULDER

## 2022-01-29 ASSESSMENT — PAIN - FUNCTIONAL ASSESSMENT
PAIN_FUNCTIONAL_ASSESSMENT: PREVENTS OR INTERFERES SOME ACTIVE ACTIVITIES AND ADLS

## 2022-01-29 ASSESSMENT — PAIN SCALES - GENERAL
PAINLEVEL_OUTOF10: 8
PAINLEVEL_OUTOF10: 7
PAINLEVEL_OUTOF10: 8
PAINLEVEL_OUTOF10: 7
PAINLEVEL_OUTOF10: 4

## 2022-01-29 ASSESSMENT — PAIN DESCRIPTION - FREQUENCY
FREQUENCY: CONTINUOUS

## 2022-01-29 ASSESSMENT — PAIN DESCRIPTION - DESCRIPTORS
DESCRIPTORS: TIGHTNESS

## 2022-01-29 ASSESSMENT — PAIN DESCRIPTION - PROGRESSION
CLINICAL_PROGRESSION: NOT CHANGED

## 2022-01-29 ASSESSMENT — PAIN DESCRIPTION - PAIN TYPE
TYPE: ACUTE PAIN

## 2022-01-29 NOTE — H&P
Hospital Medicine History & Physical      PCP: No primary care provider on file. Date of Admission: 1/28/2022    Date of Service: Pt seen/examined on 1/28/22  and   Placed in Observation. Chief Complaint:  Sob/ chest pain 2 days      History Of Present Illness:     42 y.o. male who presented to [facility] with  Above c/o . He was brought from [facility] with above complaints. Chest pain is more central and no radiation increased with respiration does no associated nausea vomiting dizziness or syncope. He had a spell of shortness of breath and heart racing this afternoon and found to have SVT. He received adenosine by EMS. He was brought over here for further evaluation. Currently he does not have any chest pain shortness of breath dizziness or palpitation. He denies fever cough congestion nausea or vomiting. His appetite is okay. He told he did not go bowel for 1 month and he did not feel well for last 3 days. He has a history of congenital heart problem and underwent cardiac surgery when he was 10years old. Past Medical History:          Diagnosis Date    Murmur        Past Surgical History:          Procedure Laterality Date    OTHER SURGICAL HISTORY      \"open heart surgery as a kid\"        Medications Prior to Admission:      Prior to Admission medications    Medication Sig Start Date End Date Taking? Authorizing Provider   clonazePAM (KLONOPIN) 1 MG tablet Take 1 mg by mouth 2 times daily as needed. Yes Historical Provider, MD       Allergies:  Ibuprofen    Social History:      The patient currently lives at [facility]    TOBACCO:   reports that he has been smoking cigarettes. He has been smoking about 1.00 pack per day. He has never used smokeless tobacco.  ETOH:   reports previous alcohol use.   E-Cigarettes/Vaping Use     Questions Responses    E-Cigarette/Vaping Use Never User    Start Date     Passive Exposure     Quit Date     Counseling Given     Comments Family History:       Reviewed in detail and negative for DM, CAD, Cancer, CVA. Positive as follows:    History reviewed. No pertinent family history. REVIEW OF SYSTEMS COMPLETED:   Pertinent positives as noted in the HPI. All other systems reviewed and negative. PHYSICAL EXAM PERFORMED:    BP (!) 162/89   Pulse 75   Temp 98.9 °F (37.2 °C) (Oral)   Resp 20   Ht 6' 2\" (1.88 m)   Wt 228 lb (103.4 kg)   SpO2 97%   BMI 29.27 kg/m²     General appearance:  No apparent distress, appears stated age and cooperative. HEENT:  Normal cephalic, atraumatic without obvious deformity. Pupils equal, round, and reactive to light. Extra ocular muscles intact. Conjunctivae/corneas clear. Neck: Supple, with full range of motion. No jugular venous distention. Trachea midline. Respiratory:  Normal respiratory effort. Clear to auscultation, bilaterally without Rales/Wheezes/Rhonchi. Cardiovascular:  Regular rate and rhythm with normal S1/S2 with murmurs no , rubs or gallops. Abdomen: Soft, non-tender, non-distended with normal bowel sounds. Musculoskeletal:  No clubbing, cyanosis or edema bilaterally. Full range of motion without deformity. Skin: Skin color, texture, turgor normal.  No rashes or lesions. Neurologic:  Neurovascularly intact without any focal sensory/motor deficits. Psychiatric:  Alert and oriented, thought content appropriate, normal insight  Capillary Refill: Brisk,3 seconds, normal  Peripheral Pulses: +2 palpable, equal bilaterally       Labs:     Recent Labs     01/28/22  1550   WBC 6.3   HGB 15.0   HCT 45.4        Recent Labs     01/28/22  1550      K 3.6      CO2 22   BUN 13   CREATININE 0.8*   CALCIUM 9.5     Recent Labs     01/28/22  1550   AST 24   ALT 18   BILITOT 0.4   ALKPHOS 79     No results for input(s): INR in the last 72 hours.   Recent Labs     01/28/22  1550 01/28/22  1838   TROPONINI <0.01 <0.01       Urinalysis:      Lab Results   Component Value Date NITRU Negative 01/28/2022    BLOODU Negative 01/28/2022    SPECGRAV 1.010 01/28/2022    GLUCOSEU Negative 01/28/2022       Radiology:     CXR: I have reviewed the CXR with the following interpretation:   EKG:  I have reviewed the EKG with the following interpretation: Normal sinus rhythm 87/min    CT CHEST PULMONARY EMBOLISM W CONTRAST   Final Result   No acute pulmonary embolus is identified. No acute cardiopulmonary disease. XR CHEST PORTABLE   Final Result   No acute cardiopulmonary disease. ASSESSMENT:    Active Hospital Problems    Diagnosis Date Noted    SVT (supraventricular tachycardia) ([facility] [address] [I47.1] 01/28/2022         PLAN:    Episode of shortness of breath chest pain palpitation and SVT-currently in normal sinus rhythm and asymptomatic-admit, telemetry, troponin, echocardiogram, TSH, cardiology evaluation Plavix, statin, lipid profile in the morning    Anxiety-on Klonopin    Constipation-senna    History of  [place] 7 January-asymptomatic now        DVT Prophylaxis: Lovenox  Diet: Regular, n.p.o. midnight  Code Status: Full code    PT/OT Eval Status:     Dispo -admitted Joey Joaquin 5 telemetry observation       María Yancey MD    Thank you No primary care provider on file. for the opportunity to be involved in this patient's care. If you have any questions or concerns please feel free to contact me at [phone].

## 2022-01-29 NOTE — ED PROVIDER NOTES
I independently examined and evaluated Alexys Bateman. I personally saw the patient and performed a substantive portion of the visit including all aspects of the medical decision making    In brief, Alexys Bateman is a 41 y.o. male with a past medical history of cardiac murmur, who presents to the ED complaining of chest pain. Patient began having left-sided chest pain over the past 2 to 3 days. Patient had an outpatient x-ray in [facility] that was unremarkable. Patient was at rest today when he suddenly felt his heart racing. He reports anxiety associated with this feeling. When evaluated by [facility] medical, patient was found to have heart rate 220 bpm.  Patient received 6 mg of adenosine by EMS which converted the patient back to a normal rate. Patient also received aspirin load and sublingual nitro and 50 mcg of fentanyl. Patient reports he has continued to have chest pain. Denies shortness of breath or nausea. He is no longer feeling palpitations. Patient does smoke and reports a family history of young cardiac disease. Denies history of blood clots or active malignancy. Patient denies unilateral leg swelling, hemoptysis, recent travel or surgery/immobilization, or OCP or other hormone use. REVIEW OF SYSTEMS  All systems reviewed, pertinent positives per HPI otherwise noted to be negative. Focused exam revealed   PHYSICAL EXAM  BP (!) 162/89   Pulse 75   Temp 98.9 °F (37.2 °C) (Oral)   Resp 20   Ht 6' 2\" (1.88 m)   Wt 228 lb (103.4 kg)   SpO2 97%   BMI 29.27 kg/m²    GENERAL APPEARANCE: Awake and alert. Cooperative. no distress. HENT: Normocephalic. Atraumatic. Mucous membranes are moist  NECK: Supple. Full range of motion of the neck without stiffness or pain. EYES: PERRL. EOM's grossly intact. HEART/CHEST: RRR. Chest wall is not tender to palpation. LUNGS: Respirations unlabored. CTAB. Good air exchange. Speaking comfortably in full sentences. ABDOMEN: No tenderness. Soft. Non-distended. No masses. No organomegaly. No guarding or rebound. MUSCULOSKELETAL: No extremity edema. Compartments soft. No deformity. No tenderness in the extremities. All extremities neurovascularly intact. SKIN: Warm and dry. No acute rashes. NEUROLOGICAL: Alert and oriented. No gross facial drooping. Strength 5/5, sensation intact. PSYCHIATRIC: Normal mood and affect. ED course / MDM:   Overall well appearing patient, in no acute distress, presenting for intermittent chest pain over the past 2 to 3 days. Patient had an episode of SVT prehospital that was converted with adenosine. Patient also received nitroglycerin and aspirin. Physical exam unremarkable. I personally saw the patient and performed a substantive portion of the visit including all aspects of the medical decision making. Differential diagnosis includes but is not limited to: Pneumonia, pneumothorax, pleural effusion, ACS, CHF exacerbation, COPD exacerbation, asthma, pulmonary embolism, arrhythmia, anemia    Labs, EKG, and imaging obtained. Workup showed:    CT CHEST PULMONARY EMBOLISM W CONTRAST   Final Result   No acute pulmonary embolus is identified. No acute cardiopulmonary disease. XR CHEST PORTABLE   Final Result   No acute cardiopulmonary disease. The Ekg interpreted by me shows  normal sinus rhythm with a rate of 87  Axis is   Normal  QTc is  within an acceptable range  Intervals and Durations are unremarkable. ST Segments: normal  No previous for comparison    ED Course as of 02/06/22 0602   Fri Jan 28, 2022   1645 Urinalysis shows no evidence of blood or infection [ER]   1645 No leukocytosis, anemia, thrombocytopenia. [ER]   1840 Urine Drug screen negative [ER]   1840 D-dimer is mildly elevated, CT PE study obtained and was negative. [ER]   1840 Initial troponin within normal limits.  [ER]   1840 No electrolyte abnormalities or evidence of kidney dysfunction [ER]   1840 Liver Function testing unremarkable [ER]   2005 Delta troponin negative [ER]      ED Course User Index  [ER] Paulette Espinoza MD       At least 3 minutes of smoking cessation education was provided to the patient. Based on results of work-up, I am concerned for chest pain of unknown etiology. Heart score of 4. At this time, do feel the patient requires admission for further work-up and management. Discussed the patient with hospital team.    CLINICAL IMPRESSION  1. Chest pain, unspecified type    2. SVT (supraventricular tachycardia) (HCC)        Blood pressure (!) 143/93, pulse 85, temperature 97.8 °F (36.6 °C), temperature source Oral, resp. rate 16, height 6' 2\" (1.88 m), weight 177 lb 6.4 oz (80.5 kg), SpO2 97 %. Adeel Rodriguez was admitted in stable condition. All diagnostic, treatment, and disposition decisions were made by myself in conjunction with the advanced practice provider. For all further details of the patient's emergency department visit, please see the advanced practice provider's documentation. Comment: Please note this report has been produced using speech recognition software and may contain errors related to that system including errors in grammar, punctuation, and spelling, as well as words and phrases that may be inappropriate. If there are any questions or concerns please feel free to contact the dictating provider for clarification.         Paulette Espinoza MD  02/06/22 5458

## 2022-01-29 NOTE — CONSULTS
Electrophysiology Consultation   Date: 1/29/2022  Admit Date:  1/28/2022  Reason for Consultation: Supraventricular tachycardia. Consult Requesting Physician: Eligio Ochoa MD     Chief Complaint   Patient presents with    Tachycardia     Per EMS pt from [address] cp started Gladys Morales couple of days ago\" today felt like \"I was going to have a panic attack\" nurse called EMS, pt was in SVT, 6 adenosine administered, converted back to nsr. Pt given 1 nitro and 50 mcg fentanyl en route for the pain.  Chest Pain     HPI:   Patient is a pleasant 51-year-old male with a medical history seen for congenital cardiac defect status post repair at age 10 at [facility] (unable to see notes from [facility] to this effect) who presents from home with symptomatic supraventricular tachycardia. According patient is interested of health until yesterday when he awoke with some agitation and chest discomfort. He was found to have heart rates in the 200s and EMS was called. He was brought from the custodial house to [facility]. During his transit patient was given adenosine and converted to normal sinus rhythm. Since arriving to [facility] patient has had no further tachycardias. Unfortunately no data on patient's chart did show SVT or reaction to adenosine. Patient has a family history of early coronary artery disease. Patient denies fevers, chest pain, orthopnea, PND, lower extremity edema, abdominal swelling, shortness of breath, dyspnea on exertion, chills, visual changes, headaches, sore throat, cough, abdominal pain, nausea, vomiting, bleeding, bruising, dysuria, muscle/joint pain, confusion, depression, anxiety, skin lesions, etc.    Emergency Room/Hospital Course:  Patient is evaluated emergency room yesterday. His CMP was reassuring. His troponin enzymes were negative x4. His urine drug screen was negative. His CBC was reassuring.   Chest CT PE was negative for PE or cardiopulmonary disease. Cardiology was consulted to evaluate patient. Past Medical History:   Diagnosis Date    Murmur         Past Surgical History:   Procedure Laterality Date    OTHER SURGICAL HISTORY      \"open heart surgery as a kid\"        Allergies   Allergen Reactions    Ibuprofen      \"stop breathing\"        Social History:  Reviewed. reports that he has been smoking cigarettes. He has been smoking about 1.00 pack per day. He has never used smokeless tobacco. He reports previous alcohol use. He reports previous drug use. Family History:  Reviewed. family history is not on file. No premature CAD. Review of System:  All other systems reviewed except for that noted above. Pertinent negatives and positives are:     · General: negative for fever, chills   · Ophthalmic ROS: negative for - eye pain or loss of vision  · ENT ROS: negative for - headaches, sore throat   · Respiratory: negative for - cough, sputum  · Cardiovascular: Reviewed in HPI  · Gastrointestinal: negative for - abdominal pain, diarrhea, N/V  · Hematology: negative for - bleeding, blood clots, bruising or jaundice  · Genito-Urinary:  negative for - Dysuria or incontinence  · Musculoskeletal: negative for - Joint swelling, muscle pain  · Neurological: negative for - confusion, dizziness, headaches   · Psychiatric: No anxiety, no depression. · Dermatological: negative for - rash    Physical Examination:  Vitals:    22 0812   BP: (!) 145/78   Pulse: 84   Resp: 20   Temp: 98.2 °F (36.8 °C)   SpO2: 98%      No intake or output data in the 24 hours ending 22 1004  No intake/output data recorded.    Wt Readings from Last 3 Encounters:   22 178 lb 9.2 oz (81 kg)   21 228 lb (103.4 kg)   19 225 lb (102.1 kg)     Temp  Av.3 °F (36.8 °C)  Min: 97.8 °F (36.6 °C)  Max: 98.9 °F (37.2 °C)  Pulse  Av  Min: 75  Max: 97  BP  Min: 112/87  Max: 162/89  SpO2  Av.6 %  Min: 96 %  Max: 100 %    · Telemetry: Sinus rhythm and sinus bradycardia  · Constitutional: Alert. Oriented to person, place, and time. No distress. · Head: Normocephalic and atraumatic. · Mouth/Throat: Lips appear moist. Oropharynx is clear and moist.  · Eyes: Conjunctivae normal. EOM are normal.   · Neck: Neck supple. No lymphadenopathy. No rigidity. No JVD present. · Cardiovascular: Normal rate, regular rhythm. Normal S1&S2. · Pulmonary/Chest: Bilateral respiratory sounds present. No respiratory accessory muscle use. · Abdominal: Soft. Normal bowel sounds present. No distension, No tenderness. · Musculoskeletal: No tenderness. No edema    · Neurological: Alert and oriented. Cranial nerve II-XII grossly intact. · Skin: Skin is warm and dry. No rash, lesions, ulcerations noted. · Psychiatric: No anxiety nor agitation. Labs:  Reviewed. Recent Labs     01/28/22  1550 01/29/22  0605    140   K 3.6 3.6    105   CO2 22 24   BUN 13 12   CREATININE 0.8* 0.7*     Recent Labs     01/28/22  1550 01/29/22  0605   WBC 6.3 6.4   HGB 15.0 14.2   HCT 45.4 41.9   MCV 87.0 85.9    214     Lab Results   Component Value Date    TROPONINI <0.01 01/29/2022     No results found for: BNP  No results found for: PROTIME, INR  No results found for: CHOL, HDL, TRIG    Diagnostic and imaging results reviewed. ECG: Normal sinus rhythm. Poor R wave progression. Echo: Pending. Cath: None. I independently reviewed the ECG and telemetry.     Scheduled Meds:   nicotine  1 patch TransDERmal Daily    sodium chloride flush  5-40 mL IntraVENous 2 times per day    atorvastatin  40 mg Oral Nightly    senna  1 tablet Oral Nightly    enoxaparin  40 mg SubCUTAneous Daily    clopidogrel  75 mg Oral Daily    influenza virus vaccine  0.5 mL IntraMUSCular Prior to discharge     Continuous Infusions:   sodium chloride       PRN Meds:.morphine, clonazePAM, sodium chloride flush, sodium chloride, ondansetron **OR** ondansetron, acetaminophen **OR** acetaminophen, polyethylene glycol, perflutren lipid microspheres     Assessment:   Patient Active Problem List    Diagnosis Date Noted    SVT (supraventricular tachycardia) ([facility] [address] 01/28/2022      Active Hospital Problems    Diagnosis Date Noted    [facility] [I47.1] 01/28/2022     Patient is a pleasant 79-year-old male with a medical history seen for congenital cardiac defect status post repair at age 10 at [facility] (unable to see notes from [facility] to this effect) who presents from home with symptomatic supraventricular tachycardia. Problem List:  1. Supraventricular tachycardia. Assessment and Plan:  1. Supraventricular tachycardia. Patient is a pleasant 79-year-old male with a medical history significant for congenital cardiomyopathy status post repair (etiology unclear. No records from [facility]) who presents from home with supraventricular tachycardia. Unfortunately events did not record I would leave any recordings of his tachycardia. His tach responded to adenosine. Given his cardiac history I suspected that patient would have a flutter however it be unusual for a flutter to be responsive to adenosine. Is adenosine responsive as most likely reflects an AV node dependent physiology. We discussed options including monitoring as this is his first episode of SVT as far as we know, cardiac monitor with [place] agent, and an electrophysiology study with possible ablation. Surprisingly patient was interested in electrophysiology study with ablation as he would like to know what is going on. We will schedule him for an EP study with me or Dr. Bindu Velez early next week. Risks and benefits of procedure discussed in detail with patient. All questions concerns addressed. - NPO at midnight on Sunday for possible EPS with ablation. If can't be done on Monday then plan for Tuesday, hopefully with Dr. Bindu Velez. - Avoid [place] agents.   - We will attempt to get records from [facility].  - If SVT then EKG + adenosine with EKG. - No [place] agents if possible. - Stop plavix and atorvastatin (post lipid panel). - Continue telemetry. - We will continue to follow along with you. Thank you for allowing me to participate in the care of Teresa Castro . If you have any questions/comments, please do not hesitate to contact us.     Veda Dupont MD  Cardiac Electrophysiology  [facility]  [phone] [facility]

## 2022-01-29 NOTE — CARE COORDINATION
CASE MANAGEMENT INITIAL ASSESSMENT      Reviewed chart and completed assessment with patient:bedside  Explained Case Management role/services. Primary contact information:[facility] Emergency Department Decision Maker :   Primary Decision Maker: Chemo Wiggins - [facility] - [phone]          Can this person be reached and be able to respond quickly, such as within a few minutes or hours? Yes    Admit date/status:1/28/22  Diagnosis:SVT   Is this a Readmission?:  No      Insurance:[facility]   Precert required for SNF: Yes       3 night stay required: No    Living arrangements, Adls, care needs, prior to admission:lives in ranch style home with fiance and kid. Transportation:tbd     Durable Medical Equipment at home:  Walker__Cane__RTS__ BSC__Shower Chair__  02__ HHN__ CPAP__  BiPap__  Hospital Bed__ W/C___ Other__________    Services in the home and/or outpatient, prior to [address] Notification (HEN):needed for SNF    Barriers to discharge: currently with police in attendance and ankle cuffed to bed. Plan/comments:spoke with patient. Reported IPTA and uses no DME. Will likely have no DCP needs.       [facility] on chart for MD signature

## 2022-01-29 NOTE — PROGRESS NOTES
Yanna from [facility] medical updated on pt plan of care. Told to call back later for a better update on plan of care.

## 2022-01-29 NOTE — PROGRESS NOTES
Hospitalist Progress Note      PCP: No primary care provider on file. Date of Admission: 1/28/2022    Chief Complaint: Shortness of breath/chest pain for 2 days    Hospital Course: This 72-year-old male admitted with chest pain from [facility] found to have SVT received Adenosine by EMS, history of congenital heart disease status post surgery at age 10. Subjective: Patient continues complain of chest pain and shortness of breath he continues to smoke 1 pack/day for 30 years. Medications:  Reviewed    Infusion Medications    sodium chloride       Scheduled Medications    sodium chloride flush  5-40 mL IntraVENous 2 times per day    atorvastatin  40 mg Oral Nightly    senna  1 tablet Oral Nightly    enoxaparin  40 mg SubCUTAneous Daily    clopidogrel  75 mg Oral Daily    influenza virus vaccine  0.5 mL IntraMUSCular Prior to discharge     PRN Meds: morphine, clonazePAM, sodium chloride flush, sodium chloride, ondansetron **OR** ondansetron, acetaminophen **OR** acetaminophen, polyethylene glycol, perflutren lipid microspheres    No intake or output data in the 24 hours ending 01/29/22 0847    Physical Exam Performed:    BP (!) 145/78   Pulse 84   Temp 98.2 °F (36.8 °C) (Oral)   Resp 20   Ht 6' 2\" (1.88 m)   Wt 178 lb 9.2 oz (81 kg)   SpO2 98%   BMI 22.93 kg/m²     General appearance: No apparent distress, appears stated age and cooperative. HEENT: Pupils equal, round, and reactive to light. Conjunctivae/corneas clear. Neck: Supple, with full range of motion. No jugular venous distention. Trachea midline. Respiratory:  Normal respiratory effort. Clear to auscultation, bilaterally without Rales/Wheezes/Rhonchi. Cardiovascular: Regular rate and rhythm with normal S1/S2 without murmurs, rubs or gallops. Abdomen: Soft, non-tender, non-distended with normal bowel sounds. Musculoskeletal: No clubbing, cyanosis or edema bilaterally. Full range of motion without deformity.   Skin: Skin color, texture, turgor normal.  No rashes or lesions. Neurologic:  Neurovascularly intact without any focal sensory/motor deficits. Cranial nerves: II-XII intact, grossly non-focal.  Psychiatric: Alert and oriented, thought content appropriate, normal insight  Capillary Refill: Brisk,3 seconds, normal   Peripheral Pulses: +2 palpable, equal bilaterally       Labs:   Recent Labs     01/28/22  1550 01/29/22  0605   WBC 6.3 6.4   HGB 15.0 14.2   HCT 45.4 41.9    214     Recent Labs     01/28/22  1550 01/29/22  0605    140   K 3.6 3.6    105   CO2 22 24   BUN 13 12   CREATININE 0.8* 0.7*   CALCIUM 9.5 9.0     Recent Labs     01/28/22  1550   AST 24   ALT 18   BILITOT 0.4   ALKPHOS 79     No results for input(s): INR in the last 72 hours. Recent Labs     01/28/22  1838 01/29/22  0106 01/29/22  0605   TROPONINI <0.01 <0.01 <0.01       Urinalysis:      Lab Results   Component Value Date    NITRU Negative 01/28/2022    BLOODU Negative 01/28/2022    SPECGRAV 1.010 01/28/2022    GLUCOSEU Negative 01/28/2022       Radiology:  CT CHEST PULMONARY EMBOLISM W CONTRAST   Final Result   No acute pulmonary embolus is identified. No acute cardiopulmonary disease. XR CHEST PORTABLE   Final Result   No acute cardiopulmonary disease. Assessment/Plan:    Active Hospital Problems    Diagnosis     SVT (supraventricular tachycardia) ([address] [I47.1]      1. This is a 30-year-old male admitted from [facility] detention with chest pain or shortness of breath noted to have SVT status post 1 dose of adenosine by EMS currently remains in normal sinus rhythm 2D echo pending cardiology consult pending. 2.  Tobacco abuse recommended patient to stop smoking start him on nicotine patch. 3.  Constipation continue Senokot.     DVT Prophylaxis: Lovenox subcu  Diet: Diet NPO  Code Status: Full Code    PT/OT Eval Status:     Lulu Carrasquillo MD

## 2022-01-30 LAB
EKG ATRIAL RATE: 75 BPM
EKG DIAGNOSIS: NORMAL
EKG P AXIS: 33 DEGREES
EKG P-R INTERVAL: 146 MS
EKG Q-T INTERVAL: 372 MS
EKG QRS DURATION: 90 MS
EKG QTC CALCULATION (BAZETT): 415 MS
EKG R AXIS: 102 DEGREES
EKG T AXIS: 70 DEGREES
EKG VENTRICULAR RATE: 75 BPM
TROPONIN: <0.01 NG/ML

## 2022-01-30 PROCEDURE — 6360000002 HC RX W HCPCS: Performed by: NURSE PRACTITIONER

## 2022-01-30 PROCEDURE — 93005 ELECTROCARDIOGRAM TRACING: CPT | Performed by: NURSE PRACTITIONER

## 2022-01-30 PROCEDURE — 6370000000 HC RX 637 (ALT 250 FOR IP): Performed by: HOSPITALIST

## 2022-01-30 PROCEDURE — 96376 TX/PRO/DX INJ SAME DRUG ADON: CPT

## 2022-01-30 PROCEDURE — 84484 ASSAY OF TROPONIN QUANT: CPT

## 2022-01-30 PROCEDURE — 6370000000 HC RX 637 (ALT 250 FOR IP): Performed by: INTERNAL MEDICINE

## 2022-01-30 PROCEDURE — G0378 HOSPITAL OBSERVATION PER HR: HCPCS

## 2022-01-30 PROCEDURE — 36415 COLL VENOUS BLD VENIPUNCTURE: CPT

## 2022-01-30 PROCEDURE — 6360000002 HC RX W HCPCS: Performed by: INTERNAL MEDICINE

## 2022-01-30 PROCEDURE — 93010 ELECTROCARDIOGRAM REPORT: CPT | Performed by: INTERNAL MEDICINE

## 2022-01-30 PROCEDURE — 2580000003 HC RX 258: Performed by: INTERNAL MEDICINE

## 2022-01-30 RX ORDER — MORPHINE SULFATE 2 MG/ML
1 INJECTION, SOLUTION INTRAMUSCULAR; INTRAVENOUS EVERY 4 HOURS PRN
Status: DISCONTINUED | OUTPATIENT
Start: 2022-01-30 | End: 2022-02-01 | Stop reason: HOSPADM

## 2022-01-30 RX ORDER — HYDROMORPHONE HCL 110MG/55ML
0.5 PATIENT CONTROLLED ANALGESIA SYRINGE INTRAVENOUS EVERY 4 HOURS PRN
Status: DISCONTINUED | OUTPATIENT
Start: 2022-01-30 | End: 2022-02-01 | Stop reason: HOSPADM

## 2022-01-30 RX ORDER — MORPHINE SULFATE 2 MG/ML
2 INJECTION, SOLUTION INTRAMUSCULAR; INTRAVENOUS ONCE
Status: COMPLETED | OUTPATIENT
Start: 2022-01-30 | End: 2022-01-30

## 2022-01-30 RX ADMIN — SODIUM CHLORIDE, PRESERVATIVE FREE 10 ML: 5 INJECTION INTRAVENOUS at 08:38

## 2022-01-30 RX ADMIN — ACETAMINOPHEN 650 MG: 325 TABLET ORAL at 11:45

## 2022-01-30 RX ADMIN — CLONAZEPAM 1 MG: 1 TABLET ORAL at 15:56

## 2022-01-30 RX ADMIN — MORPHINE SULFATE 2 MG: 2 INJECTION, SOLUTION INTRAMUSCULAR; INTRAVENOUS at 03:09

## 2022-01-30 RX ADMIN — MORPHINE SULFATE 2 MG: 2 INJECTION, SOLUTION INTRAMUSCULAR; INTRAVENOUS at 08:42

## 2022-01-30 RX ADMIN — POLYETHYLENE GLYCOL 3350 17 G: 17 POWDER, FOR SOLUTION ORAL at 09:01

## 2022-01-30 RX ADMIN — NITROGLYCERIN 0.4 MG: 0.4 TABLET, ORALLY DISINTEGRATING SUBLINGUAL at 15:56

## 2022-01-30 RX ADMIN — MORPHINE SULFATE 2 MG: 2 INJECTION, SOLUTION INTRAMUSCULAR; INTRAVENOUS at 22:04

## 2022-01-30 RX ADMIN — ENOXAPARIN SODIUM 40 MG: 40 INJECTION SUBCUTANEOUS at 08:38

## 2022-01-30 RX ADMIN — SODIUM CHLORIDE, PRESERVATIVE FREE 10 ML: 5 INJECTION INTRAVENOUS at 19:52

## 2022-01-30 RX ADMIN — NITROGLYCERIN 0.4 MG: 0.4 TABLET, ORALLY DISINTEGRATING SUBLINGUAL at 11:46

## 2022-01-30 RX ADMIN — CLONAZEPAM 1 MG: 1 TABLET ORAL at 01:06

## 2022-01-30 ASSESSMENT — PAIN SCALES - GENERAL
PAINLEVEL_OUTOF10: 6
PAINLEVEL_OUTOF10: 0
PAINLEVEL_OUTOF10: 5
PAINLEVEL_OUTOF10: 9
PAINLEVEL_OUTOF10: 6
PAINLEVEL_OUTOF10: 5
PAINLEVEL_OUTOF10: 0
PAINLEVEL_OUTOF10: 6

## 2022-01-30 ASSESSMENT — PAIN DESCRIPTION - PAIN TYPE
TYPE: ACUTE PAIN

## 2022-01-30 ASSESSMENT — PAIN - FUNCTIONAL ASSESSMENT
PAIN_FUNCTIONAL_ASSESSMENT: PREVENTS OR INTERFERES SOME ACTIVE ACTIVITIES AND ADLS

## 2022-01-30 ASSESSMENT — PAIN DESCRIPTION - LOCATION
LOCATION: CHEST;NECK;SHOULDER
LOCATION: CHEST;SHOULDER
LOCATION: CHEST;NECK;SHOULDER

## 2022-01-30 ASSESSMENT — PAIN DESCRIPTION - PROGRESSION
CLINICAL_PROGRESSION: NOT CHANGED
CLINICAL_PROGRESSION: NOT CHANGED

## 2022-01-30 ASSESSMENT — PAIN DESCRIPTION - FREQUENCY
FREQUENCY: CONTINUOUS

## 2022-01-30 ASSESSMENT — PAIN DESCRIPTION - ONSET
ONSET: ON-GOING
ONSET: ON-GOING

## 2022-01-30 ASSESSMENT — PAIN DESCRIPTION - DESCRIPTORS
DESCRIPTORS: TIGHTNESS
DESCRIPTORS: TIGHTNESS
DESCRIPTORS: PRESSURE

## 2022-01-30 NOTE — PROGRESS NOTES
Pt called RN into room, stating he was having a panic attack that was contributing to his chest pain and that his heart was beating out of his chest. PT rated pain 9/10, set of vitals obtained, pt states this is not a new pain this is the same pain he has been having. Will give PRNs and continue to monitor.

## 2022-01-30 NOTE — PROGRESS NOTES
Electrophysiology Progress Note     Admit Date: 2022     Reason for follow up: Supraventricular tachycadia    Interval History:   - Patient seen and examined. - Clinical notes reviewed. - Telemetry reviewed. No arrhythmias.  - No new complaints today. - No major events overnight.   - Denies having chest pain, shortness of breath, dyspnea on exertion, Orthopnea, PND at the time of this visit. Physical Examination:  Vitals:    22 0747   BP: (!) 129/96   Pulse: 93   Resp: 16   Temp: 97.5 °F (36.4 °C)   SpO2: 97%        Intake/Output Summary (Last 24 hours) at 2022 0853  Last data filed at 2022 0844  Gross per 24 hour   Intake 720 ml   Output 0 ml   Net 720 ml     In: 720 [P.O.:720]  Out: 0    Wt Readings from Last 3 Encounters:   22 177 lb 12.8 oz (80.6 kg)   21 228 lb (103.4 kg)   19 225 lb (102.1 kg)     Temp  Av.7 °F (36.5 °C)  Min: 97.3 °F (36.3 °C)  Max: 98.1 °F (36.7 °C)  Pulse  Av.3  Min: 71  Max: 93  BP  Min: 129/96  Max: 152/86  SpO2  Av.2 %  Min: 96 %  Max: 99 %    · Telemetry: Sinus rhythm and sinus tachycardia. · Constitutional: Alert. Oriented to person, place, and time. No distress. · Head: Normocephalic and atraumatic. · Mouth/Throat: Lips appear moist. Oropharynx is clear and moist.  · Neck: Neck supple. No lymphadenopathy. No rigidity. No JVD present. · Cardiovascular: Normal rate, regular rhythm. Normal S1&S2. · Pulmonary/Chest: Bilateral respiratory sounds present. No respiratory accessory muscle use. · Abdominal: Soft. Normal bowel sounds present. No distension, No tenderness. No splenomegaly. No hernia. · Musculoskeletal: No tenderness. No edema    · Neurological: Alert and oriented. Cranial nerve II-XII grossly intact. · Skin: Skin is warm and dry. No rash, lesions, ulcerations noted. · Psychiatric: No anxiety nor agitation. Labs, diagnostic and imaging results reviewed. Reviewed.    Recent Labs     22  5161 01/29/22  0605    140   K 3.6 3.6    105   CO2 22 24   BUN 13 12   CREATININE 0.8* 0.7*     Recent Labs     01/28/22  1550 01/29/22  0605   WBC 6.3 6.4   HGB 15.0 14.2   HCT 45.4 41.9   MCV 87.0 85.9    214     Lab Results   Component Value Date    TROPONINI <0.01 01/29/2022     Estimated Creatinine Clearance: 152 mL/min (A) (based on SCr of 0.7 mg/dL (L)). No results found for: BNP  No results found for: PROTIME, INR  Lab Results   Component Value Date    CHOL 140 01/29/2022    HDL 44 01/29/2022    TRIG 91 01/29/2022       Scheduled Meds:   nicotine  1 patch TransDERmal Daily    sodium chloride flush  5-40 mL IntraVENous 2 times per day    atorvastatin  40 mg Oral Nightly    senna  1 tablet Oral Nightly    enoxaparin  40 mg SubCUTAneous Daily    influenza virus vaccine  0.5 mL IntraMUSCular Prior to discharge     Continuous Infusions:   sodium chloride       PRN Meds:nitroGLYCERIN, morphine, clonazePAM, sodium chloride flush, sodium chloride, ondansetron **OR** ondansetron, acetaminophen **OR** acetaminophen, polyethylene glycol, perflutren lipid microspheres     Patient Active Problem List    Diagnosis Date Noted    SVT (supraventricular tachycardia) ([facility] [address] 01/28/2022      Active Hospital Problems    Diagnosis Date Noted    SVT (supraventricular tachycardia) ([facility] [address] [I47.1] 01/28/2022     Patient is a pleasant 44-year-old male with a medical history seen for congenital cardiac defect status post repair at age 10 at [facility][note 2] (unable to see notes from [facility] this effect) who presents from home with symptomatic supraventricular tachycardia.      Problem List:  1. Supraventricular tachycardia.     Assessment and Plan:  1. Supraventricular tachycardia. 01/29/2022  Patient is a pleasant 44-year-old male with a medical history significant for congenital cardiomyopathy status post repair (etiology unclear.   No records from [facility][note 2]) who presents from home with supraventricular tachycardia. Unfortunately events did not record I would leave any recordings of his tachycardia. His tach responded to adenosine. Given his cardiac history I suspected that patient would have a flutter however it be unusual for a flutter to be responsive to adenosine. Is adenosine responsive as most likely reflects an AV node dependent physiology. We discussed options including monitoring as this is his first episode of SVT as far as we know, cardiac monitor with [place], and an electrophysiology study with possible ablation. Surprisingly patient was interested in electrophysiology study with ablation as he would like to know what is going on. We will schedule him for an EP study with me or Dr. Luz Elena Fuentes early next week. Risks and benefits of procedure discussed in detail with patient. All questions concerns addressed. - NPO at midnight on Sunday for possible EPS with ablation. If can't be done on Monday then plan for Tuesday, hopefully with Dr. Luz Elena Fuentes. - Avoid [place] agents. - We will attempt to get records from [facility].  - If SVT then EKG + adenosine with EKG. - No [place] agents if possible. - Stop plavix and atorvastatin (post lipid panel). - Continue telemetry. - We will continue to follow along with you. 01/30/2022  Patient doing well. Some palpitations overnight around 0300 this am.  No arrhythmias noted on telemetry.    - NPO at midnight on Sunday for possible EPS with ablation. If can't be done on Monday then plan for Tuesday, hopefully with Dr. Luz Elena Fuentes. - Avoid [place] agents. - We will attempt to get records from [facility].  - If SVT then EKG + adenosine with EKG. - No [place] agents if possible. - Stop statin. - Continue telemetry. - We will continue to follow along with you. Thank you for allowing me to participate in the care of this patient.  If you have any questions, please do not hesitate to contact me.    Cassandra Kendrick MD  Cardiac Electrophysiology  [address] [facility]  [phone] [facility]

## 2022-01-30 NOTE — PROGRESS NOTES
reduced with an ejection   fraction of 45-50 %. There is hypokinesis of the mid anteroseptal wall. There is mild concentric left ventricular hypertrophy. Normal left ventricular diastolic filling pressure. No significant valvular heart disease. EP cardiology consulted and following n.p.o. after midnight for possible ablation tomorrow, trying to obtain records from [facility].    2.  Tobacco abuse recommended patient to stop smoking start him on nicotine patch. 3.  Constipation continue Senokot.   4.  History of substance abuse discussed with the patient will discontinue IV morphine.       DVT Prophylaxis: Lovenox subcu  Diet: Cardiac  Code Status: Full Code    PT/OT Eval Status: Not indicated    Sofiya Ruiz MD

## 2022-01-30 NOTE — PROGRESS NOTES
Paged cross-cover at this time:     \"Pt here from [facility] jail for persistent chest pain, right now he pt is rating it 7/10 chest and shoulder tightness. During day shift he had nitro and morphine after getting a stat EKG that was normal. Pt states that the nitro only lasted for a couple minutes and that the morphine did not help and just made him itch. Would you like to add something else for the pain at this time?  Thanks\"

## 2022-01-30 NOTE — PROGRESS NOTES
Paged cross-cover at this time:     \"Pt is still complaining of 7/10 pain, please advise. \"     Response:     \"One time morphine ordered with adjustment to his usual PRN dosing \"

## 2022-01-31 ENCOUNTER — APPOINTMENT (OUTPATIENT)
Dept: CARDIAC CATH/INVASIVE PROCEDURES | Age: 47
End: 2022-01-31
Payer: COMMERCIAL

## 2022-01-31 ENCOUNTER — ANESTHESIA (OUTPATIENT)
Dept: INPATIENT UNIT | Age: 47
End: 2022-01-31
Payer: COMMERCIAL

## 2022-01-31 ENCOUNTER — ANESTHESIA EVENT (OUTPATIENT)
Dept: INPATIENT UNIT | Age: 47
End: 2022-01-31
Payer: COMMERCIAL

## 2022-01-31 LAB
ABO/RH: NORMAL
ANION GAP SERPL CALCULATED.3IONS-SCNC: 12 MMOL/L (ref 3–16)
ANTIBODY SCREEN: NORMAL
BUN BLDV-MCNC: 10 MG/DL (ref 7–20)
CALCIUM SERPL-MCNC: 9.8 MG/DL (ref 8.3–10.6)
CHLORIDE BLD-SCNC: 105 MMOL/L (ref 99–110)
CO2: 25 MMOL/L (ref 21–32)
CREAT SERPL-MCNC: 0.7 MG/DL (ref 0.9–1.3)
EKG ATRIAL RATE: 91 BPM
EKG DIAGNOSIS: NORMAL
EKG P AXIS: 86 DEGREES
EKG P-R INTERVAL: 184 MS
EKG Q-T INTERVAL: 360 MS
EKG QRS DURATION: 88 MS
EKG QTC CALCULATION (BAZETT): 442 MS
EKG R AXIS: 100 DEGREES
EKG T AXIS: 70 DEGREES
EKG VENTRICULAR RATE: 91 BPM
GFR AFRICAN AMERICAN: >60
GFR NON-AFRICAN AMERICAN: >60
GLUCOSE BLD-MCNC: 97 MG/DL (ref 70–99)
HCT VFR BLD CALC: 49.1 % (ref 40.5–52.5)
HEMOGLOBIN: 16.2 G/DL (ref 13.5–17.5)
LV EF: 48 %
LVEF MODALITY: NORMAL
MCH RBC QN AUTO: 28.9 PG (ref 26–34)
MCHC RBC AUTO-ENTMCNC: 33.1 G/DL (ref 31–36)
MCV RBC AUTO: 87.2 FL (ref 80–100)
PDW BLD-RTO: 14.9 % (ref 12.4–15.4)
PLATELET # BLD: 224 K/UL (ref 135–450)
PMV BLD AUTO: 8.1 FL (ref 5–10.5)
POTASSIUM SERPL-SCNC: 4.7 MMOL/L (ref 3.5–5.1)
RBC # BLD: 5.63 M/UL (ref 4.2–5.9)
SARS-COV-2, NAAT: NOT DETECTED
SODIUM BLD-SCNC: 142 MMOL/L (ref 136–145)
TROPONIN: <0.01 NG/ML
WBC # BLD: 5.9 K/UL (ref 4–11)

## 2022-01-31 PROCEDURE — G0378 HOSPITAL OBSERVATION PER HR: HCPCS

## 2022-01-31 PROCEDURE — 6370000000 HC RX 637 (ALT 250 FOR IP): Performed by: HOSPITALIST

## 2022-01-31 PROCEDURE — C1730 CATH, EP, 19 OR FEW ELECT: HCPCS

## 2022-01-31 PROCEDURE — 84484 ASSAY OF TROPONIN QUANT: CPT

## 2022-01-31 PROCEDURE — 80048 BASIC METABOLIC PNL TOTAL CA: CPT

## 2022-01-31 PROCEDURE — 93010 ELECTROCARDIOGRAM REPORT: CPT | Performed by: INTERNAL MEDICINE

## 2022-01-31 PROCEDURE — C1892 INTRO/SHEATH,FIXED,PEEL-AWAY: HCPCS

## 2022-01-31 PROCEDURE — 93623 PRGRMD STIMJ&PACG IV RX NFS: CPT | Performed by: INTERNAL MEDICINE

## 2022-01-31 PROCEDURE — 96376 TX/PRO/DX INJ SAME DRUG ADON: CPT

## 2022-01-31 PROCEDURE — 99152 MOD SED SAME PHYS/QHP 5/>YRS: CPT

## 2022-01-31 PROCEDURE — 93351 STRESS TTE COMPLETE: CPT

## 2022-01-31 PROCEDURE — C1894 INTRO/SHEATH, NON-LASER: HCPCS

## 2022-01-31 PROCEDURE — 6370000000 HC RX 637 (ALT 250 FOR IP): Performed by: NURSE PRACTITIONER

## 2022-01-31 PROCEDURE — 2580000003 HC RX 258: Performed by: INTERNAL MEDICINE

## 2022-01-31 PROCEDURE — 87635 SARS-COV-2 COVID-19 AMP PRB: CPT

## 2022-01-31 PROCEDURE — 6360000002 HC RX W HCPCS: Performed by: HOSPITALIST

## 2022-01-31 PROCEDURE — 6370000000 HC RX 637 (ALT 250 FOR IP): Performed by: INTERNAL MEDICINE

## 2022-01-31 PROCEDURE — 93653 COMPRE EP EVAL TX SVT: CPT

## 2022-01-31 PROCEDURE — 85027 COMPLETE CBC AUTOMATED: CPT

## 2022-01-31 PROCEDURE — 2709999900 HC NON-CHARGEABLE SUPPLY

## 2022-01-31 PROCEDURE — 86850 RBC ANTIBODY SCREEN: CPT

## 2022-01-31 PROCEDURE — 86900 BLOOD TYPING SEROLOGIC ABO: CPT

## 2022-01-31 PROCEDURE — 36415 COLL VENOUS BLD VENIPUNCTURE: CPT

## 2022-01-31 PROCEDURE — 6360000002 HC RX W HCPCS

## 2022-01-31 PROCEDURE — 99153 MOD SED SAME PHYS/QHP EA: CPT

## 2022-01-31 PROCEDURE — 99215 OFFICE O/P EST HI 40 MIN: CPT | Performed by: NURSE PRACTITIONER

## 2022-01-31 PROCEDURE — 93623 PRGRMD STIMJ&PACG IV RX NFS: CPT

## 2022-01-31 PROCEDURE — C1732 CATH, EP, DIAG/ABL, 3D/VECT: HCPCS

## 2022-01-31 PROCEDURE — 86901 BLOOD TYPING SEROLOGIC RH(D): CPT

## 2022-01-31 PROCEDURE — 99152 MOD SED SAME PHYS/QHP 5/>YRS: CPT | Performed by: INTERNAL MEDICINE

## 2022-01-31 PROCEDURE — 2500000003 HC RX 250 WO HCPCS

## 2022-01-31 PROCEDURE — 93653 COMPRE EP EVAL TX SVT: CPT | Performed by: INTERNAL MEDICINE

## 2022-01-31 RX ORDER — FENTANYL CITRATE 50 UG/ML
INJECTION, SOLUTION INTRAMUSCULAR; INTRAVENOUS
Status: COMPLETED | OUTPATIENT
Start: 2022-01-31 | End: 2022-01-31

## 2022-01-31 RX ORDER — MIDAZOLAM HYDROCHLORIDE 5 MG/ML
INJECTION INTRAMUSCULAR; INTRAVENOUS
Status: COMPLETED | OUTPATIENT
Start: 2022-01-31 | End: 2022-01-31

## 2022-01-31 RX ORDER — LISINOPRIL 10 MG/1
5 TABLET ORAL DAILY
Status: DISCONTINUED | OUTPATIENT
Start: 2022-02-01 | End: 2022-02-01 | Stop reason: HOSPADM

## 2022-01-31 RX ORDER — METOPROLOL SUCCINATE 25 MG/1
25 TABLET, EXTENDED RELEASE ORAL DAILY
Status: DISCONTINUED | OUTPATIENT
Start: 2022-01-31 | End: 2022-02-01 | Stop reason: HOSPADM

## 2022-01-31 RX ORDER — NICOTINE 21 MG/24HR
1 PATCH, TRANSDERMAL 24 HOURS TRANSDERMAL DAILY
Status: DISCONTINUED | OUTPATIENT
Start: 2022-01-31 | End: 2022-02-01 | Stop reason: HOSPADM

## 2022-01-31 RX ORDER — SODIUM CHLORIDE 9 MG/ML
25 INJECTION, SOLUTION INTRAVENOUS PRN
Status: CANCELLED | OUTPATIENT
Start: 2022-01-31

## 2022-01-31 RX ORDER — SODIUM CHLORIDE 0.9 % (FLUSH) 0.9 %
5-40 SYRINGE (ML) INJECTION PRN
Status: CANCELLED | OUTPATIENT
Start: 2022-01-31

## 2022-01-31 RX ORDER — ACETAMINOPHEN 325 MG/1
650 TABLET ORAL EVERY 4 HOURS PRN
Status: CANCELLED | OUTPATIENT
Start: 2022-01-31

## 2022-01-31 RX ORDER — SODIUM CHLORIDE 0.9 % (FLUSH) 0.9 %
5-40 SYRINGE (ML) INJECTION EVERY 12 HOURS SCHEDULED
Status: CANCELLED | OUTPATIENT
Start: 2022-01-31

## 2022-01-31 RX ORDER — CYCLOBENZAPRINE HCL 10 MG
5 TABLET ORAL 3 TIMES DAILY PRN
Status: DISCONTINUED | OUTPATIENT
Start: 2022-01-31 | End: 2022-02-01 | Stop reason: HOSPADM

## 2022-01-31 RX ORDER — HYDROMORPHONE HCL 110MG/55ML
1 PATIENT CONTROLLED ANALGESIA SYRINGE INTRAVENOUS EVERY 4 HOURS PRN
Status: DISCONTINUED | OUTPATIENT
Start: 2022-01-31 | End: 2022-02-01 | Stop reason: HOSPADM

## 2022-01-31 RX ORDER — OXYCODONE HYDROCHLORIDE AND ACETAMINOPHEN 5; 325 MG/1; MG/1
2 TABLET ORAL EVERY 4 HOURS PRN
Status: DISCONTINUED | OUTPATIENT
Start: 2022-01-31 | End: 2022-02-01 | Stop reason: HOSPADM

## 2022-01-31 RX ADMIN — SODIUM CHLORIDE, PRESERVATIVE FREE 10 ML: 5 INJECTION INTRAVENOUS at 21:00

## 2022-01-31 RX ADMIN — MIDAZOLAM HYDROCHLORIDE 1 MG: 5 INJECTION INTRAMUSCULAR; INTRAVENOUS at 15:21

## 2022-01-31 RX ADMIN — MIDAZOLAM HYDROCHLORIDE 1 MG: 5 INJECTION INTRAMUSCULAR; INTRAVENOUS at 18:55

## 2022-01-31 RX ADMIN — FENTANYL CITRATE 150 MCG: 50 INJECTION, SOLUTION INTRAMUSCULAR; INTRAVENOUS at 18:13

## 2022-01-31 RX ADMIN — MIDAZOLAM HYDROCHLORIDE 3 MG: 5 INJECTION INTRAMUSCULAR; INTRAVENOUS at 16:20

## 2022-01-31 RX ADMIN — FENTANYL CITRATE 100 MCG: 50 INJECTION, SOLUTION INTRAMUSCULAR; INTRAVENOUS at 16:20

## 2022-01-31 RX ADMIN — FENTANYL CITRATE 50 MCG: 50 INJECTION, SOLUTION INTRAMUSCULAR; INTRAVENOUS at 15:26

## 2022-01-31 RX ADMIN — CLONAZEPAM 1 MG: 1 TABLET ORAL at 23:14

## 2022-01-31 RX ADMIN — MORPHINE SULFATE 1 MG: 2 INJECTION, SOLUTION INTRAMUSCULAR; INTRAVENOUS at 12:00

## 2022-01-31 RX ADMIN — MIDAZOLAM HYDROCHLORIDE 1 MG: 5 INJECTION INTRAMUSCULAR; INTRAVENOUS at 15:36

## 2022-01-31 RX ADMIN — MIDAZOLAM HYDROCHLORIDE 1 MG: 5 INJECTION INTRAMUSCULAR; INTRAVENOUS at 15:26

## 2022-01-31 RX ADMIN — SENNOSIDES 8.6 MG: 8.6 TABLET, COATED ORAL at 21:26

## 2022-01-31 RX ADMIN — FENTANYL CITRATE 50 MCG: 50 INJECTION, SOLUTION INTRAMUSCULAR; INTRAVENOUS at 15:20

## 2022-01-31 RX ADMIN — MIDAZOLAM HYDROCHLORIDE 2 MG: 5 INJECTION INTRAMUSCULAR; INTRAVENOUS at 15:21

## 2022-01-31 RX ADMIN — MIDAZOLAM HYDROCHLORIDE 4 MG: 5 INJECTION INTRAMUSCULAR; INTRAVENOUS at 18:12

## 2022-01-31 RX ADMIN — MIDAZOLAM HYDROCHLORIDE 4 MG: 5 INJECTION INTRAMUSCULAR; INTRAVENOUS at 17:47

## 2022-01-31 RX ADMIN — FENTANYL CITRATE 100 MCG: 50 INJECTION, SOLUTION INTRAMUSCULAR; INTRAVENOUS at 17:47

## 2022-01-31 RX ADMIN — FENTANYL CITRATE 50 MCG: 50 INJECTION, SOLUTION INTRAMUSCULAR; INTRAVENOUS at 16:55

## 2022-01-31 RX ADMIN — SODIUM CHLORIDE, PRESERVATIVE FREE 10 ML: 5 INJECTION INTRAVENOUS at 08:35

## 2022-01-31 RX ADMIN — FENTANYL CITRATE 50 MCG: 50 INJECTION, SOLUTION INTRAMUSCULAR; INTRAVENOUS at 16:13

## 2022-01-31 RX ADMIN — MIDAZOLAM HYDROCHLORIDE 3 MG: 5 INJECTION INTRAMUSCULAR; INTRAVENOUS at 17:05

## 2022-01-31 RX ADMIN — MIDAZOLAM HYDROCHLORIDE 1 MG: 5 INJECTION INTRAMUSCULAR; INTRAVENOUS at 16:13

## 2022-01-31 RX ADMIN — FENTANYL CITRATE 50 MCG: 50 INJECTION, SOLUTION INTRAMUSCULAR; INTRAVENOUS at 15:12

## 2022-01-31 RX ADMIN — FENTANYL CITRATE 50 MCG: 50 INJECTION, SOLUTION INTRAMUSCULAR; INTRAVENOUS at 15:21

## 2022-01-31 RX ADMIN — ACETAMINOPHEN 650 MG: 325 TABLET ORAL at 04:02

## 2022-01-31 RX ADMIN — FENTANYL CITRATE 100 MCG: 50 INJECTION, SOLUTION INTRAMUSCULAR; INTRAVENOUS at 17:05

## 2022-01-31 RX ADMIN — MIDAZOLAM HYDROCHLORIDE 1 MG: 5 INJECTION INTRAMUSCULAR; INTRAVENOUS at 15:12

## 2022-01-31 RX ADMIN — FENTANYL CITRATE 50 MCG: 50 INJECTION, SOLUTION INTRAMUSCULAR; INTRAVENOUS at 18:54

## 2022-01-31 RX ADMIN — METOPROLOL SUCCINATE 25 MG: 25 TABLET, EXTENDED RELEASE ORAL at 21:26

## 2022-01-31 RX ADMIN — CLONAZEPAM 1 MG: 1 TABLET ORAL at 04:00

## 2022-01-31 RX ADMIN — OXYCODONE AND ACETAMINOPHEN 2 TABLET: 5; 325 TABLET ORAL at 21:26

## 2022-01-31 ASSESSMENT — PAIN SCALES - GENERAL
PAINLEVEL_OUTOF10: 8
PAINLEVEL_OUTOF10: 0
PAINLEVEL_OUTOF10: 2
PAINLEVEL_OUTOF10: 0
PAINLEVEL_OUTOF10: 5
PAINLEVEL_OUTOF10: 2
PAINLEVEL_OUTOF10: 7
PAINLEVEL_OUTOF10: 6
PAINLEVEL_OUTOF10: 6

## 2022-01-31 ASSESSMENT — PAIN DESCRIPTION - PAIN TYPE: TYPE: ACUTE PAIN

## 2022-01-31 ASSESSMENT — PAIN DESCRIPTION - LOCATION: LOCATION: CHEST

## 2022-01-31 NOTE — ANESTHESIA PRE PROCEDURE
Department of Anesthesiology  Preprocedure Note       Name:  Lorena Frost   Age:  41 y.o.  :  1975                                          MRN:  [id number]         Date:  2022      Surgeon: * Surgery not found *    Procedure:     Medications prior to admission:   Prior to Admission medications    Medication Sig Start Date End Date Taking? Authorizing Provider   clonazePAM (KLONOPIN) 1 MG tablet Take 1 mg by mouth 2 times daily as needed.    Yes Historical Provider, MD       Current medications:    Current Facility-Administered Medications   Medication Dose Route Frequency Provider Last Rate Last Admin    perflutren lipid microspheres (DEFINITY) injection 1.65 mg  1.5 mL IntraVENous ONCE PRN Salima Hollis APRN - CNP        morphine (PF) injection 1 mg  1 mg IntraVENous Q4H PRN Juliet Lyn MD   1 mg at 22 1200    HYDROmorphone (DILAUDID) injection 0.5 mg  0.5 mg IntraVENous Q4H PRN Royce Lam APRN - CNP        nicotine (NICODERM CQ) 21 MG/24HR 1 patch  1 patch TransDERmal Daily Juliet Lyn MD   1 patch at 22 0835    nitroGLYCERIN (NITROSTAT) SL tablet 0.4 mg  0.4 mg SubLINGual Q5 Min PRN Juliet Lyn MD   0.4 mg at 22 1556    clonazePAM (KLONOPIN) tablet 1 mg  1 mg Oral BID PRN Adeel Jules MD   1 mg at 22 0400    sodium chloride flush 0.9 % injection 5-40 mL  5-40 mL IntraVENous 2 times per day Adeel Jules MD   10 mL at 22 0835    sodium chloride flush 0.9 % injection 5-40 mL  5-40 mL IntraVENous PRN Adeel Jules MD        0.9 % sodium chloride infusion  25 mL IntraVENous PRN Adeel Jlues MD        ondansetron (ZOFRAN-ODT) disintegrating tablet 4 mg  4 mg Oral Q8H PRN Adeel Jules MD        Or    ondansetron (ZOFRAN) injection 4 mg  4 mg IntraVENous Q6H PRN Adeel Jules MD        acetaminophen (TYLENOL) tablet 650 mg  650 mg Oral Q6H PRN Adeel Jules MD   650 mg at 22 0402    Or    BMI:   Wt Readings from Last 3 Encounters:   01/31/22 177 lb 6.4 oz (80.5 kg)   03/05/21 228 lb (103.4 kg)   08/04/19 225 lb (102.1 kg)     Body mass index is 22.78 kg/m². CBC:   Lab Results   Component Value Date    WBC 5.9 01/31/2022    RBC 5.63 01/31/2022    HGB 16.2 01/31/2022    HCT 49.1 01/31/2022    MCV 87.2 01/31/2022    RDW 14.9 01/31/2022     01/31/2022       CMP:   Lab Results   Component Value Date     01/31/2022    K 4.7 01/31/2022    K 3.6 01/29/2022     01/31/2022    CO2 25 01/31/2022    BUN 10 01/31/2022    CREATININE 0.7 01/31/2022    GFRAA >60 01/31/2022    AGRATIO 1.7 01/28/2022    LABGLOM >60 01/31/2022    GLUCOSE 97 01/31/2022    PROT 7.2 01/28/2022    CALCIUM 9.8 01/31/2022    BILITOT 0.4 01/28/2022    ALKPHOS 79 01/28/2022    AST 24 01/28/2022    ALT 18 01/28/2022       POC Tests: No results for input(s): POCGLU, POCNA, POCK, POCCL, POCBUN, POCHEMO, POCHCT in the last 72 hours.     Coags: No results found for: PROTIME, INR, APTT    HCG (If Applicable): No results found for: PREGTESTUR, PREGSERUM, HCG, HCGQUANT     ABGs: No results found for: PHART, PO2ART, UFE2NON, WCC3RLH, BEART, D0OWAUJM     Type & Screen (If Applicable):  No results found for: LABABO, LABRH    Drug/Infectious Status (If Applicable):  No results found for: HIV, HEPCAB    COVID-19 Screening (If Applicable):   Lab Results   Component Value Date    COVID19 Not Detected 01/31/2022           Anesthesia Evaluation  Patient summary reviewed and Nursing notes reviewed no history of anesthetic complications:   Airway: Mallampati: II     Neck ROM: full   Dental:          Pulmonary:Negative Pulmonary ROS and normal exam                               Cardiovascular:Negative CV ROS    (+) valvular problems/murmurs:, dysrhythmias:,                   Neuro/Psych:   Negative Neuro/Psych ROS              GI/Hepatic/Renal: Neg GI/Hepatic/Renal ROS       (-) hiatal hernia and GERD Endo/Other: Negative Endo/Other ROS                    Abdominal:             Vascular: Other Findings:             Anesthesia Plan      general     ASA 2 - emergent     (I discussed with the patient the risks and benefits of PIV, general anesthesia, IV Narcotics, PACU. All questions were answered the patient agrees with the plan and wishes to proceed.  )  Induction: intravenous. Pre-Operative Diagnosis: SVT (supraventricular tachycardia) (HCC) [I47.1]; Chest pain, unspecified type [R07.9]    41 y.o.   BMI:  Body mass index is 22.78 kg/m².      Vitals:    01/30/22 2332 01/31/22 0340 01/31/22 0730 01/31/22 1140   BP: 120/83 (!) 156/83 (!) 138/96 123/85   Pulse: 98 84 100    Resp: 18 18 16 16   Temp: 97.5 °F (36.4 °C) 97.9 °F (36.6 °C) 97.6 °F (36.4 °C) 97.7 °F (36.5 °C)   TempSrc: Oral Oral Oral Oral   SpO2: 97% 98% 99% 96%   Weight:  177 lb 6.4 oz (80.5 kg)     Height:           Allergies   Allergen Reactions    Ibuprofen      \"stop breathing\"        Social History     Tobacco Use    Smoking status: Current Every Day Smoker     Packs/day: 1.00     Types: Cigarettes    Smokeless tobacco: Never Used   Substance Use Topics    Alcohol use: Not Currently       LABS:    CBC  Lab Results   Component Value Date/Time    WBC 5.9 01/31/2022 08:37 AM    HGB 16.2 01/31/2022 08:37 AM    HCT 49.1 01/31/2022 08:37 AM     01/31/2022 08:37 AM     RENAL  Lab Results   Component Value Date/Time     01/31/2022 08:37 AM    K 4.7 01/31/2022 08:37 AM    K 3.6 01/29/2022 06:05 AM     01/31/2022 08:37 AM    CO2 25 01/31/2022 08:37 AM    BUN 10 01/31/2022 08:37 AM    CREATININE 0.7 (L) 01/31/2022 08:37 AM    GLUCOSE 97 01/31/2022 08:37 AM     COAGS  No results found for: PROTIME, INR, APTT      Vadim Butts MD   1/31/2022

## 2022-01-31 NOTE — PROGRESS NOTES
Hospitalist Progress Note      PCP: No primary care provider on file. Date of Admission: 1/28/2022    Chief Complaint: Shortness of breath/chest pain for 2 days    Hospital Course: This 30-year-old male admitted with chest pain from [facility] found to have SVT received Adenosine by EMS, history of congenital heart disease status post surgery at age 10. 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 Medications:  Reviewed    Infusion Medications    sodium chloride       Scheduled Medications    nicotine  1 patch TransDERmal Daily    sodium chloride flush  5-40 mL IntraVENous 2 times per day    senna  1 tablet Oral Nightly    enoxaparin  40 mg SubCUTAneous Daily    influenza virus vaccine  0.5 mL IntraMUSCular Prior to discharge     PRN Meds: perflutren lipid microspheres, morphine, HYDROmorphone, nitroGLYCERIN, clonazePAM, sodium chloride flush, sodium chloride, ondansetron **OR** ondansetron, acetaminophen **OR** acetaminophen, polyethylene glycol, perflutren lipid microspheres      Intake/Output Summary (Last 24 hours) at 1/31/2022 1402  Last data filed at 1/31/2022 1220  Gross per 24 hour   Intake 600 ml   Output 0 ml   Net 600 ml       Physical Exam Performed:    /85   Pulse 100   Temp 97.7 °F (36.5 °C) (Oral)   Resp 16   Ht 6' 2\" (1.88 m)   Wt 177 lb 6.4 oz (80.5 kg)   SpO2 96%   BMI 22.78 kg/m²     General appearance: No apparent distress, appears stated age and cooperative. HEENT: Pupils equal, round, and reactive to light. Conjunctivae/corneas clear. Neck: Supple, with full range of motion. No jugular venous distention. Trachea midline. Respiratory:  Normal respiratory effort. Clear to auscultation, bilaterally without Rales/Wheezes/Rhonchi.   Cardiovascular: Regular rate and rhythm with normal S1/S2 without murmurs, rubs or gallops. Abdomen: Soft, non-tender, non-distended with normal bowel sounds. Musculoskeletal: No clubbing, cyanosis or edema bilaterally. Full range of motion without deformity. Skin: Skin color, texture, turgor normal.  No rashes or lesions. Neurologic:  Neurovascularly intact without any focal sensory/motor deficits. Cranial nerves: II-XII intact, grossly non-focal.  Psychiatric: Alert and oriented, thought content appropriate, normal insight  Capillary Refill: Brisk,3 seconds, normal   Peripheral Pulses: +2 palpable, equal bilaterally       Labs:   Recent Labs     01/28/22  1550 01/29/22  0605 01/31/22  0837   WBC 6.3 6.4 5.9   HGB 15.0 14.2 16.2   HCT 45.4 41.9 49.1    214 224     Recent Labs     01/28/22  1550 01/29/22  0605 01/31/22  0837    140 142   K 3.6 3.6 4.7    105 105   CO2 22 24 25   BUN 13 12 10   CREATININE 0.8* 0.7* 0.7*   CALCIUM 9.5 9.0 9.8     Recent Labs     01/28/22  1550   AST 24   ALT 18   BILITOT 0.4   ALKPHOS 79     No results for input(s): INR in the last 72 hours. Recent Labs     01/29/22  0605 01/30/22  2149 01/31/22  0019   TROPONINI <0.01 <0.01 <0.01       Urinalysis:      Lab Results   Component Value Date    NITRU Negative 01/28/2022    BLOODU Negative 01/28/2022    SPECGRAV 1.010 01/28/2022    GLUCOSEU Negative 01/28/2022       Radiology:  CT CHEST PULMONARY EMBOLISM W CONTRAST   Final Result   No acute pulmonary embolus is identified. No acute cardiopulmonary disease. XR CHEST PORTABLE   Final Result   No acute cardiopulmonary disease. Assessment/Plan:    Active Hospital Problems    Diagnosis     SVT (supraventricular tachycardia) (HCC) [I47.1]      This is a 51-year-old male admitted from [facility] with chest pain or shortness of breath noted to have SVT status post 1 dose of adenosine by EMS currently remains in normal sinus rhythm 2D echo on 1/29/2022   Technically difficult examination.  Apical views off axis secondary to pt   restraints. The left ventricular systolic function is mildly reduced with an ejection   fraction of 45-50 %. There is hypokinesis of the mid anteroseptal wall. There is mild concentric left ventricular hypertrophy. Normal left ventricular diastolic filling pressure. No significant valvular heart disease. EP cardiology consulted and following n.p.o., possible ablation today, trying to obtain records from [facility].  Stress/Echo    Tobacco abuse recommended patient to stop smoking start him on nicotine patch    Constipation continue Senokot.     History of substance abuse discussed with the patient will discontinue IV morphine.     DVT Prophylaxis: Lovenox   Diet: Cardiac  Code Status: Full Code    PT/OT Eval Status: Not indicated    Dispo - pending course, possibly 2/1    MELONY Thurman - CNP

## 2022-01-31 NOTE — CARE COORDINATION
Pt having cardiac cath today; Pt cuffed to bed w/police there, from shelter. Pt will return at d/c, likely with no needs. CM will continue to follow for possible needs, but likely has none.   Sandi Fowler RN

## 2022-01-31 NOTE — PROGRESS NOTES
Darron Rueda remains at bedside with patient for 24 hour supervision. Pt shackled by left ankle to bed. Pt's mom updated on plan of care per pt request.   Pt transfer to [facility] at this time. [facility] made aware.

## 2022-01-31 NOTE — PROGRESS NOTES
[facility]     Electrophysiology                                     Progress Note    Admission date:  2022    Reason for follow up visit: SVT     HPI/CC: Sarah Murphy was admitted on 2022 with possible SVT. He reported chest pain and palpitations from the [facility] group home. He was found to have HR in the 200's and EMS was called. He was given adenosine en route and converted to NSR. No rhythm strips are available. Echo showed an EF of 45-50% and hypokinesis of mid anteroseptal wall. Rhythm has been SR/ST. Subjective: He complains of intermittent chest pain and anxiety. He felt his heart racing over night. Denies shortness of breath, dizziness or palpitations. Vitals:  Blood pressure (!) 138/96, pulse 100, temperature 97.6 °F (36.4 °C), temperature source Oral, resp. rate 16, height 6' 2\" (1.88 m), weight 177 lb 6.4 oz (80.5 kg), SpO2 99 %.   Temp  Av.7 °F (36.5 °C)  Min: 97.5 °F (36.4 °C)  Max: 97.9 °F (36.6 °C)  Pulse  Av.6  Min: 75  Max: 100  BP  Min: 120/83  Max: 156/83  SpO2  Av %  Min: 95 %  Max: 99 %    24 hour I/O    Intake/Output Summary (Last 24 hours) at 2022 0844  Last data filed at 2022 0831  Gross per 24 hour   Intake 1080 ml   Output 0 ml   Net 1080 ml     Current Facility-Administered Medications   Medication Dose Route Frequency Provider Last Rate Last Admin    morphine (PF) injection 1 mg  1 mg IntraVENous Q4H PRN Gemma Hoyt MD        HYDROmorphone (DILAUDID) injection 0.5 mg  0.5 mg IntraVENous Q4H PRN MELONY Cueto - CNP        nicotine (NICODERM CQ) 21 MG/24HR 1 patch  1 patch TransDERmal Daily Gemma Hoyt MD   1 patch at 22 0835    nitroGLYCERIN (NITROSTAT) SL tablet 0.4 mg  0.4 mg SubLINGual Q5 Min PRN Gemma Hoyt MD   0.4 mg at 22 1556    clonazePAM (KLONOPIN) tablet 1 mg  1 mg Oral BID PRN Mulu Felix MD   1 mg at 22 0400    sodium chloride flush 0.9 % injection 5-40 mL 5-40 mL IntraVENous 2 times per day Nicolle Smith MD   10 mL at 01/31/22 0835    sodium chloride flush 0.9 % injection 5-40 mL  5-40 mL IntraVENous PRN Nicolle Smith MD        0.9 % sodium chloride infusion  25 mL IntraVENous PRN Nicolle Smith MD        ondansetron (ZOFRAN-ODT) disintegrating tablet 4 mg  4 mg Oral Q8H PRN Nicolle Smith MD        Or    ondansetron (ZOFRAN) injection 4 mg  4 mg IntraVENous Q6H PRN Nicolle Smith MD        acetaminophen (TYLENOL) tablet 650 mg  650 mg Oral Q6H PRN Nicolle Smith MD   650 mg at 01/31/22 0402    Or    acetaminophen (TYLENOL) suppository 650 mg  650 mg Rectal Q6H PRN Nicolle Smith MD        polyethylene glycol (GLYCOLAX) packet 17 g  17 g Oral Daily PRN Nicolle Smith MD   17 g at 01/30/22 0901    perflutren lipid microspheres (DEFINITY) injection 1.65 mg  1.5 mL IntraVENous ONCE PRN Nicolle Smith MD        senna (SENOKOT) tablet 8.6 mg  1 tablet Oral Nightly Nicolle Smith MD        enoxaparin (LOVENOX) injection 40 mg  40 mg SubCUTAneous Daily Nicolle Smith MD   40 mg at 01/30/22 8254    influenza quadrivalent split vaccine (FLUZONE;FLUARIX;FLULAVAL;AFLURIA) injection 0.5 mL  0.5 mL IntraMUSCular Prior to discharge Nicolle Smith MD           Objective:     Telemetry monitor: SR    Physical Exam:  Constitutional and general appearance: alert, cooperative, no distress and appears stated age  HEENT: PERRL, no cervical lymphadenopathy. No masses palpable.  Normal oral mucosa  Respiratory:  · Normal excursion and expansion without use of accessory muscles  · Resp auscultation: Normal breath sounds without wheezing, rhonchi, and rales  Cardiovascular:  · The apical impulse is not displaced  · Heart tones are crisp and normal. regular S1 and S2.  · Jugular venous pulsation Normal  · The carotid upstroke is normal in amplitude and contour without delay or bruit  · Peripheral pulses are symmetrical and full   Abdomen:  · No masses or tenderness  · Bowel sounds present  Extremities:  ·  No cyanosis or clubbing  ·  No lower extremity edema  ·  Skin: warm and dry  Neurological:  · Alert and oriented  · Moves all extremities well  · No abnormalities of mood, affect, memory, mentation, or behavior are noted    Data    Echo 1/29/2022:  Conclusions      Summary   Technically difficult examination. Apical views off axis secondary to pt   restraints. The left ventricular systolic function is mildly reduced with an ejection   fraction of 45-50 %. There is hypokinesis of the mid anteroseptal wall. There is mild concentric left ventricular hypertrophy. Normal left ventricular diastolic filling pressure. No significant valvular heart disease. All labs and testing reviewed.   Lab Review     Renal Profile:   Lab Results   Component Value Date    CREATININE 0.7 01/29/2022    BUN 12 01/29/2022     01/29/2022    K 3.6 01/29/2022     01/29/2022    CO2 24 01/29/2022     CBC:    Lab Results   Component Value Date    WBC 6.4 01/29/2022    RBC 4.88 01/29/2022    HGB 14.2 01/29/2022    HCT 41.9 01/29/2022    MCV 85.9 01/29/2022    RDW 14.5 01/29/2022     01/29/2022     BNP:  No results found for: BNP  Fasting Lipid Panel:    Lab Results   Component Value Date    CHOL 140 01/29/2022    HDL 44 01/29/2022    TRIG 91 01/29/2022     Cardiac Enzymes:  CK/MbTroponin  Lab Results   Component Value Date    TROPONINI <0.01 01/31/2022     PT/ INR No results found for: INR, PROTIME  PTT No results found for: PTT No results found for: MG No results found for: TSH    Assessment:  Supraventricular tachycardia: stable   -noted prior to admission, responsive to adenosine but no telemetry/EKG strips available  Chest pain: ongoing   Congenital cardiac defect s/p remote repair (age 10)      Plan:   Stress echo today due to abnormal echocardiogram and ongoing chest pain  NPO for EPS and possible ablation this afternoon with Dr. Tal Kidd (risks, benefits, and alternative therapy discussed)  Will attempt to obtain medical records from [facility]   Continue to monitor on telemetry         Salima Boateng, [address] [address]  [phone]

## 2022-01-31 NOTE — PROGRESS NOTES
Paged cross-cover at this time:     \"Pts hr is now sustaining in low 100s- 1teens, pt is c/o 5/10 chest pain, ringing in ears, and sweating. Pt has history of drug abuse and MD has advised nursing staff to not give PRN morphine at this time due to pts drug history. Would you like to order anything at this time? Pt also states the nitro doesn't touch his chest pain\"     Response:     \"What about IM medication? \"     RN paged:     \"I'm not sure, I guess day shift was under the impression he is possibly withdrawing from something but his UA was neg. so I believe that was why they did want to give any narcotics along with his addiction history. Do you think you would be able to come assess the pt before we give anything? Thanks\"     Response:     \"Just give morphine x 1 dose now. Ill be up whenever I can.  Grab an EKG and Ill order a troponin as well just for completions sake\"

## 2022-01-31 NOTE — PROGRESS NOTES
Paged cross-cover:   \"Pt here for SVT, has not had a episode since admission. RN was notified by Adeilna Krueger that pt was sustaining in 150s for a little over a minute, pt is asymptomatic. Please advise\"     Response: \"Has since returned to baseline? \"    RN paged back:   \" yes he is in the 80s\"     Response:   \"Perfect. Just monitor and let me know if it happens again. Thanks! \"

## 2022-02-01 VITALS
BODY MASS INDEX: 22.77 KG/M2 | DIASTOLIC BLOOD PRESSURE: 93 MMHG | SYSTOLIC BLOOD PRESSURE: 143 MMHG | TEMPERATURE: 97.8 F | HEART RATE: 85 BPM | HEIGHT: 74 IN | OXYGEN SATURATION: 97 % | RESPIRATION RATE: 16 BRPM | WEIGHT: 177.4 LBS

## 2022-02-01 PROCEDURE — 99214 OFFICE O/P EST MOD 30 MIN: CPT | Performed by: NURSE PRACTITIONER

## 2022-02-01 PROCEDURE — 6360000002 HC RX W HCPCS: Performed by: INTERNAL MEDICINE

## 2022-02-01 PROCEDURE — 2580000003 HC RX 258: Performed by: INTERNAL MEDICINE

## 2022-02-01 PROCEDURE — G0378 HOSPITAL OBSERVATION PER HR: HCPCS

## 2022-02-01 PROCEDURE — 96372 THER/PROPH/DIAG INJ SC/IM: CPT

## 2022-02-01 PROCEDURE — 6370000000 HC RX 637 (ALT 250 FOR IP): Performed by: INTERNAL MEDICINE

## 2022-02-01 PROCEDURE — 6370000000 HC RX 637 (ALT 250 FOR IP): Performed by: NURSE PRACTITIONER

## 2022-02-01 RX ORDER — LISINOPRIL 5 MG/1
5 TABLET ORAL DAILY
Qty: 30 TABLET | Refills: 0 | Status: SHIPPED | OUTPATIENT
Start: 2022-02-02

## 2022-02-01 RX ORDER — METOPROLOL SUCCINATE 25 MG/1
25 TABLET, EXTENDED RELEASE ORAL DAILY
Qty: 30 TABLET | Refills: 0 | Status: SHIPPED | OUTPATIENT
Start: 2022-02-02 | End: 2022-04-06 | Stop reason: SDUPTHER

## 2022-02-01 RX ADMIN — OXYCODONE AND ACETAMINOPHEN 2 TABLET: 5; 325 TABLET ORAL at 01:24

## 2022-02-01 RX ADMIN — SODIUM CHLORIDE, PRESERVATIVE FREE 10 ML: 5 INJECTION INTRAVENOUS at 08:03

## 2022-02-01 RX ADMIN — METOPROLOL SUCCINATE 25 MG: 25 TABLET, EXTENDED RELEASE ORAL at 07:59

## 2022-02-01 RX ADMIN — LISINOPRIL 5 MG: 10 TABLET ORAL at 08:00

## 2022-02-01 RX ADMIN — ENOXAPARIN SODIUM 40 MG: 40 INJECTION SUBCUTANEOUS at 08:03

## 2022-02-01 RX ADMIN — OXYCODONE AND ACETAMINOPHEN 2 TABLET: 5; 325 TABLET ORAL at 09:48

## 2022-02-01 ASSESSMENT — PAIN DESCRIPTION - LOCATION
LOCATION: GROIN
LOCATION: GENERALIZED

## 2022-02-01 ASSESSMENT — PAIN SCALES - GENERAL
PAINLEVEL_OUTOF10: 7
PAINLEVEL_OUTOF10: 5
PAINLEVEL_OUTOF10: 6
PAINLEVEL_OUTOF10: 6
PAINLEVEL_OUTOF10: 0
PAINLEVEL_OUTOF10: 2

## 2022-02-01 ASSESSMENT — PAIN DESCRIPTION - ONSET: ONSET: ON-GOING

## 2022-02-01 ASSESSMENT — PAIN DESCRIPTION - PAIN TYPE
TYPE: ACUTE PAIN
TYPE: ACUTE PAIN

## 2022-02-01 NOTE — PLAN OF CARE
Successful AVNRT ablation. Full note to come.     Leopoldo Necessary, MD  Cardiac Electrophysiology  [address]  [phone] [facility]

## 2022-02-01 NOTE — PLAN OF CARE
VSS. NSR on monitor. Patient is alert and oriented. Afebrile. Bilateral groin site WDL. Some generalized soreness, medication given with some relief. Patient ambulates well. Okay to be discharged per cardiology. Patient given discharge instructions and educated on new medications, metoprolol and lisinopril. Discharged back to detention with  at bedside. Problem: Pain:  Description: Pain management should include both nonpharmacologic and pharmacologic interventions.   Goal: Pain level will decrease  Description: Pain level will decrease  Outcome: Completed  Goal: Control of acute pain  Description: Control of acute pain  Outcome: Completed  Goal: Control of chronic pain  Description: Control of chronic pain  Outcome: Completed     Problem: DAILY CARE  Goal: Daily care needs are met  Outcome: Completed     Problem: Discharge Planning:  Goal: Discharged to appropriate level of care  Description: Discharged to appropriate level of care  Outcome: Completed

## 2022-02-01 NOTE — DISCHARGE SUMMARY
Hospital Medicine Discharge Summary    Patient ID: Sophy Ram      Patient's PCP: No primary care provider on file. Admit Date: 1/28/2022     Discharge Date: 2/1/2022      Admitting Provider: Lianet Mg MD     Discharge Provider: MELONY Brannon - CNP     Discharge Diagnoses: Active Hospital Problems    Diagnosis     SVT (supraventricular tachycardia) (HCC) [I47.1]        The patient was seen and examined on day of discharge and this discharge summary is in conjunction with any daily progress note from day of discharge. Hospital Course: This is a 61-year-old male admitted from [facility] with chest pain or shortness of breath noted to have SVT status post 1 dose of adenosine by EMS currently remains in normal sinus rhythm 2D echo on 1/29/2022   Technically difficult examination. Apical views off axis secondary to pt   restraints.  Lisbeth Manriquez left ventricular systolic function is mildly reduced with an ejection   fraction of 45-50 %.   There is hypokinesis of the mid anteroseptal wall.   There is mild concentric left ventricular hypertrophy.   Normal left ventricular diastolic filling pressure.   No significant valvular heart disease.     EP cardiology consulted s/p  successful AVNRT ablation. Rhythm has been SR/ST. Trying to obtain records from [facility]. Stress/Echo:Echo showed an EF of 45-50% and hypokinesis of mid anteroseptal wall. Stress echo showed anteroseptal scar, but no ischemia     Tobacco abuse recommended patient to stop smoking start him on nicotine patch     Constipation continue Senokot.     History of substance abuse discussed with the patient will discontinue IV morphine.     Medically stable for dc on toprol and lisinopril. Follow up with EP.     Physical Exam Performed:     BP (!) 143/93   Pulse 85   Temp 97.8 °F (36.6 °C) (Oral)   Resp 16   Ht 6' 2\" (1.88 m)   Wt 177 lb 6.4 oz (80.5 kg)   SpO2 97%   BMI 22.78 kg/m²       General appearance:  No apparent distress, appears stated age and cooperative. HEENT:  Normal cephalic, atraumatic without obvious deformity. Pupils equal, round, and reactive to light. Extra ocular muscles intact. Conjunctivae/corneas clear. Neck: Supple, with full range of motion. No jugular venous distention. Trachea midline. Respiratory:  Normal respiratory effort. Clear to auscultation, bilaterally without Rales/Wheezes/Rhonchi. Cardiovascular:  Regular rate and rhythm with normal S1/S2 without murmurs, rubs or gallops. Abdomen: Soft, non-tender, non-distended with normal bowel sounds. Musculoskeletal:  No clubbing, cyanosis or edema bilaterally. Full range of motion without deformity. Skin: Skin color, texture, turgor normal.  No rashes or lesions. Neurologic:  Neurovascularly intact without any focal sensory/motor deficits. Cranial nerves: II-XII intact, grossly non-focal.  Psychiatric:  Alert and oriented, thought content appropriate, normal insight  Capillary Refill: Brisk,< 3 seconds   Peripheral Pulses: +2 palpable, equal bilaterally       Labs: For convenience and continuity at follow-up the following most recent labs are provided:      CBC:    Lab Results   Component Value Date    WBC 5.9 01/31/2022    HGB 16.2 01/31/2022    HCT 49.1 01/31/2022     01/31/2022       Renal:    Lab Results   Component Value Date     01/31/2022    K 4.7 01/31/2022    K 3.6 01/29/2022     01/31/2022    CO2 25 01/31/2022    BUN 10 01/31/2022    CREATININE 0.7 01/31/2022    CALCIUM 9.8 01/31/2022         Significant Diagnostic Studies    Radiology:   CT CHEST PULMONARY EMBOLISM W CONTRAST   Final Result   No acute pulmonary embolus is identified. No acute cardiopulmonary disease. XR CHEST PORTABLE   Final Result   No acute cardiopulmonary disease.                 Consults:     IP CONSULT TO HOSPITALIST  IP CONSULT TO CARDIOLOGY    Disposition:  [facility]    Condition at Discharge: Stable    Discharge Instructions/Follow-up:  See AVS    Code Status:  Prior     Activity: activity as tolerated    Diet: regular diet      Discharge Medications:     Discharge Medication List as of 2/1/2022  2:38 PM           Details   metoprolol succinate (TOPROL XL) 25 MG extended release tablet Take 1 tablet by mouth daily, Disp-30 tablet, R-0Normal      lisinopril (PRINIVIL;ZESTRIL) 5 MG tablet Take 1 tablet by mouth daily, Disp-30 tablet, R-0Normal              Details   clonazePAM (KLONOPIN) 1 MG tablet Take 1 mg by mouth 2 times daily as needed. Historical Med             Time Spent on discharge is more than 30 minutes in the examination, evaluation, counseling and review of medications and discharge plan. Signed:    MELONY Fernando CNP   2/3/2022      Thank you No primary care provider on file. for the opportunity to be involved in this patient's care. If you have any questions or concerns please feel free to contact me at [phone].

## 2022-02-01 NOTE — PROGRESS NOTES
[facility]     Electrophysiology                                     Progress Note    Admission date:  2022    Reason for follow up visit: SVT     HPI/CC: Angelia Lerma was admitted on 2022 with possible SVT. He reported chest pain and palpitations from the [facility] long-term. He was found to have HR in the 200's and EMS was called. He was given adenosine en route and converted to NSR. No rhythm strips are available. Echo showed an EF of 45-50% and hypokinesis of mid anteroseptal wall. Stress echo showed anteroseptal scar, but no ischemia. On 2022, he underwent successful AVNRT ablation. Rhythm has been SR/ST. Subjective: He complains of right groin tenderness. Denies chest pain, palpitations, shortness of breath, and dizziness. Vitals:  Blood pressure (!) 143/97, pulse 83, temperature 98.3 °F (36.8 °C), temperature source Oral, resp. rate 18, height 6' 2\" (1.88 m), weight 177 lb 6.4 oz (80.5 kg), SpO2 99 %.   Temp  Av °F (36.7 °C)  Min: 97.7 °F (36.5 °C)  Max: 98.3 °F (36.8 °C)  Pulse  Av  Min: 74  Max: 115  BP  Min: 118/88  Max: 143/97  SpO2  Av.3 %  Min: 96 %  Max: 99 %    24 hour I/O    Intake/Output Summary (Last 24 hours) at 2022 1132  Last data filed at 2022 1003  Gross per 24 hour   Intake 600 ml   Output 550 ml   Net 50 ml     Current Facility-Administered Medications   Medication Dose Route Frequency Provider Last Rate Last Admin    perflutren lipid microspheres (DEFINITY) injection 1.65 mg  1.5 mL IntraVENous ONCE PRN MELONY Ospina - CNP        HYDROmorphone (DILAUDID) injection 1 mg  1 mg IntraVENous Q4H PRN ROSA Whatley MD        oxyCODONE-acetaminophen (PERCOCET) 5-325 MG per tablet 2 tablet  2 tablet Oral Q4H PRN ROSA Whatley MD   2 tablet at 22 7672    metoprolol succinate (TOPROL XL) extended release tablet 25 mg  25 mg Oral Daily ROSA Whatley MD   25 mg at 22 0759    lisinopril (PRINIVIL;ZESTRIL) tablet 5 mg  5 mg Oral Daily ROSA Green MD   5 mg at 02/01/22 0800    cyclobenzaprine (FLEXERIL) tablet 5 mg  5 mg Oral TID PRN Shailesh Leija MD        nicotine (NICODERM CQ) 21 MG/24HR 1 patch  1 patch TransDERmal Daily MELONY Clement - CNP   1 patch at 02/01/22 0800    morphine (PF) injection 1 mg  1 mg IntraVENous Q4H PRN Skylar De Los Santos MD   1 mg at 01/31/22 1200    HYDROmorphone (DILAUDID) injection 0.5 mg  0.5 mg IntraVENous Q4H PRN MELONY Clement - CNP        nitroGLYCERIN (NITROSTAT) SL tablet 0.4 mg  0.4 mg SubLINGual Q5 Min PRN Skylar De Los Santos MD   0.4 mg at 01/30/22 1556    clonazePAM (KLONOPIN) tablet 1 mg  1 mg Oral BID PRN Cade Ramos MD   1 mg at 01/31/22 2314    sodium chloride flush 0.9 % injection 5-40 mL  5-40 mL IntraVENous 2 times per day Cade Ramos MD   10 mL at 02/01/22 0803    sodium chloride flush 0.9 % injection 5-40 mL  5-40 mL IntraVENous PRN Cade Ramos MD        0.9 % sodium chloride infusion  25 mL IntraVENous PRN Cade Ramos MD        ondansetron (ZOFRAN-ODT) disintegrating tablet 4 mg  4 mg Oral Q8H PRN Cade Ramos MD        Or    ondansetron (ZOFRAN) injection 4 mg  4 mg IntraVENous Q6H PRN Cade Ramos MD        acetaminophen (TYLENOL) tablet 650 mg  650 mg Oral Q6H PRN Cade Ramos MD   650 mg at 01/31/22 0402    Or    acetaminophen (TYLENOL) suppository 650 mg  650 mg Rectal Q6H PRN Cade Ramos MD        polyethylene glycol (GLYCOLAX) packet 17 g  17 g Oral Daily PRN Cade Ramos MD   17 g at 01/30/22 0901    perflutren lipid microspheres (DEFINITY) injection 1.65 mg  1.5 mL IntraVENous ONCE PRN Cade Ramos MD        senna (SENOKOT) tablet 8.6 mg  1 tablet Oral Nightly Cade Ramos MD   8.6 mg at 01/31/22 2126    enoxaparin (LOVENOX) injection 40 mg  40 mg SubCUTAneous Daily Cade Raoms MD   40 mg at 02/01/22 2478    influenza quadrivalent split vaccine (FLUZONE;FLUARIX;FLULAVAL;AFLURIA) injection 0.5 mL  0.5 mL IntraMUSCular Prior to discharge Nayan Reza MD           Objective:     Telemetry monitor: SR    Physical Exam:  Constitutional and general appearance: alert, cooperative, no distress and appears stated age  [de-identified]: PERRL, no cervical lymphadenopathy. No masses palpable. Normal oral mucosa  Respiratory:  · Normal excursion and expansion without use of accessory muscles  · Resp auscultation: Normal breath sounds without wheezing, rhonchi, and rales  Cardiovascular:  · The apical impulse is not displaced  · Heart tones are crisp and normal. regular S1 and S2.  · Jugular venous pulsation Normal  · The carotid upstroke is normal in amplitude and contour without delay or bruit  · Peripheral pulses are symmetrical and full   Abdomen:  · No masses or tenderness  · Bowel sounds present  Extremities:  ·  No cyanosis or clubbing  ·  No lower extremity edema  ·  Skin: warm and dry  · Bilateral femoral sites stable   Neurological:  · Alert and oriented  · Moves all extremities well  · No abnormalities of mood, affect, memory, mentation, or behavior are noted    Data    Echo 1/29/2022:  Conclusions      Summary   Technically difficult examination. Apical views off axis secondary to pt   restraints. The left ventricular systolic function is mildly reduced with an ejection   fraction of 45-50 %. There is hypokinesis of the mid anteroseptal wall. There is mild concentric left ventricular hypertrophy. Normal left ventricular diastolic filling pressure. No significant valvular heart disease. All labs and testing reviewed.   Lab Review     Renal Profile:   Lab Results   Component Value Date    CREATININE 0.7 01/31/2022    BUN 10 01/31/2022     01/31/2022    K 4.7 01/31/2022    K 3.6 01/29/2022     01/31/2022    CO2 25 01/31/2022     CBC:    Lab Results   Component Value Date    WBC 5.9 01/31/2022    RBC 5.63 01/31/2022    HGB 16.2 01/31/2022    HCT 49.1 01/31/2022    MCV 87.2 01/31/2022    RDW 14.9 01/31/2022     01/31/2022     BNP:  No results found for: BNP  Fasting Lipid Panel:    Lab Results   Component Value Date    CHOL 140 01/29/2022    HDL 44 01/29/2022    TRIG 91 01/29/2022     Cardiac Enzymes:  CK/MbTroponin  Lab Results   Component Value Date    TROPONINI <0.01 01/31/2022     PT/ INR No results found for: INR, PROTIME  PTT No results found for: PTT No results found for: MG No results found for: TSH    Assessment:  Supraventricular tachycardia: stable   -noted prior to admission, responsive to adenosine but no telemetry/EKG strips available   -s/p EPS and RFCA of AVNRT 1/31/2022  Chest pain: resolved   Congenital cardiac defect s/p remote repair (age 10)      Plan:   Continue Toprol and lisinopril  Post procedure instructions reviewed  Office to arrange for follow up  Okay for discharge from an [facility], [address]  [phone]

## 2022-02-01 NOTE — FLOWSHEET NOTE
Patient and I discussed how he feels with the cold weather. He was being observed by a , named Mojgan Brown. Dipak Gracia shared about one of his favorite vacations to [facility] [place], mentioned his wife who he was with for 14 years. They are  now. No further needs.        02/01/22 1142   Encounter Summary   Services provided to: Patient   Referral/Consult From: Rosamaria   Continue Visiting   (2/1 Listening)   Volunteer Visit Yes   Complexity of Encounter Moderate   Length of Encounter 15 minutes

## 2022-02-03 NOTE — PROCEDURES
[address]     Electrophysiology Procedure Note       Date of Procedure: 2/3/2022  Patient's Name: Jay Kraus  YOB: 1975   Medical Record Number: [id number]  Procedure Performed by: Osbaldo Prater MD    Procedure performed:   Comprehensive electrophysiological study with attempted induction of arrhythmia at baseline and after drug infusion.  Three-dimensional electroanatomic mapping of the left atrium    Left atrial recording and mapping via coronary sinus    Radiofrequency ablation of SVT, AVNRT. Mallampati: I  ASA: I    Sedation: Versed - 22 mg and fentanyl - 800 mcg    Indications for procedure:   Patient is a pleasant 41year old inmate male with a medical history significant for ASD status post repair and sternal wire removal who presents from correction with palpitations. Details of Procedure: The risks, benefits and alternatives of the ablation procedure were discussed with the patient. The risks including, but not limited to, the risks of bleeding, infection, radiation exposure, injury to vascular, cardiac and surrounding structures (including pneumothorax), stroke, cardiac perforation, tamponade, need for emergent open heart surgery, need for pacemaker implantation, myocardial infarction and death were discussed in detail. The patient opted to proceed with the ablation. Written informed consent was signed and placed in the chart. The patient was brought to the electrophysiology lab in a fasting nonsedated state. I provided anesthesia via nursing team.  Both groins were prepped and draped in the usual sterile fashion. After injection of 2% lidocaine in the right and left groins, two 8French sheaths were introduced to the right femoral vein and three 6F Western Carline sheaths were introduced into the left femoral vein. Dimple Scarce mapping catheter was inserted into the IVC, SVC, RA, RV and CS for anatomy using the Carto Mapping system (3D).   The Maryjane Serjio was then advanced into the coronary sinus for left atrial pacing and electrophysiology study. Using RA 3D map, we advanced three catheters sequentially to the high right atrium, right ventricular apex and HIS bundle position. Then we did our baseline measurements and programmed stimulation. We paced from high right atrial catheter and RV apex and measured baseline intervals and did programmed stimulation by bringing atrial and ventricular extrastimuli and up to 180 msec. Surya Tran PA interval 38 msec  . AH interval 110   . HV interval 45 msec (10 msec during pre-excitement)    . ME interval 170 msec   . QRS duration 86 msec  . QT interval 313 msec   . Sinus cycle length was 493 msec   . AV block 260  msec  . There was concentric atrial activation with ventricular pacing. . AERP: <400/200  . AVNERP: < 400/220  . VERP: 600/330  . RAVNERP: < 600/330    Arrhythmia Induction:   During electrophysiology study patient went into narrow complex supraventricular tachycardia. Tachycardia cycle length was 330 msec with concentric activation and earliest atrial activity on the proximal CS (not His) catheter just after His electrogram suggestive of atypical AVNRT vs mid-lateral right atrium. Septal VA time was 110 msec. His refractory PVC did not advance atrial electrograms and reset tachycardia. Entrainment from RV apex demonstrated VAHV response with PPI - TCL > 115 msec (166 msec). During RV capture with RV pacing resulted showed long transition zone. Septal VA time was 110. 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 At this point we decided perform AVN modification. Ablation & Mapping   We used the an FJ ablation catheter through long sheath. A three dimensional electroanatomical mapping of the mitral valve annulus was created using the Carto mapping system for [employer] and conduction bridge. While pacing atrial tissue from CS we targeted septal transition zone (see below) with 1:4 A:V ratio. Radiofrequency ablation lesion using (30 W and 50 C) eliminated the slow pathway. We placed radiofrequency ablation lesion on this site for two minutes using the setting mentioned above. Tachycardia continued to be inducible on an off isuprel. RV pacing and mapping earliest A was mid lateral RA region. Ablation in this region did not result in tachycardia elimination. At this point we decided to further modify AVN believing patient had a AVN with multiple pathways. After considerable AVN modification (see below), tachycardia was no longer inducible on an off isuprel. After appropriate waiting time post ablation we again did programmed stimulation and paced the atrium and ventricle and brought extrastimuli. Patient was started on isuprel for PES. No further arrhythmias induced with aggressive pacing. Post ablation:   . PA 7 interval msec  . AH interval 100   . HV interval 50 msec 160  . GA interval 160 msec   . QRS duration 91 msec  . QT interval 285 msec   . Sinus cycle length was 480 msec     The patient tolerated the procedure well and there were no complications. Hemostasis was achieved with manual pressure to groins bilaterally. Post-sedation evaluation was completed. Patient was transported to the holding area in stable condition. 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 PLAN:   The patient will be discharged home if they remain hemodynamically stable. Patient will receive usual post ablation care. Will follow the patient in EP clinic in 3 months after discharge.     Images:

## 2022-02-21 RX ORDER — METOPROLOL SUCCINATE 25 MG/1
25 TABLET, EXTENDED RELEASE ORAL DAILY
Qty: 30 TABLET | Refills: 1 | Status: SHIPPED | OUTPATIENT
Start: 2022-02-21

## 2022-02-21 RX ORDER — LISINOPRIL 5 MG/1
5 TABLET ORAL DAILY
Qty: 30 TABLET | Refills: 1 | Status: SHIPPED | OUTPATIENT
Start: 2022-02-21

## 2022-02-21 NOTE — TELEPHONE ENCOUNTER
Pt called and stated he has been in [facility] and without his medications. Pt stated he is still experiencing tachycardia and hypertension with very little exertion. Please send refills of Metoprolol Succinate 25 mg and Lisinopril 5 mg to [facility] in [facility]. Also, pt stated while in residential he was given Plavix and is still taking it. Based on [facility]'S consult note from 01/29/2022 [facility] wanted pt to stop the Plavix. Please call pt to clarify if he should stop Plavix and if he needs weaned off etc. Upcoming OV on 04/26/2022 with [address].

## 2022-03-14 ENCOUNTER — APPOINTMENT (OUTPATIENT)
Dept: GENERAL RADIOLOGY | Age: 47
End: 2022-03-14
Payer: COMMERCIAL

## 2022-03-14 ENCOUNTER — APPOINTMENT (OUTPATIENT)
Dept: CT IMAGING | Age: 47
End: 2022-03-14
Payer: COMMERCIAL

## 2022-03-14 ENCOUNTER — HOSPITAL ENCOUNTER (EMERGENCY)
Age: 47
Discharge: HOME | End: 2022-03-14
Attending: EMERGENCY MEDICINE
Payer: COMMERCIAL

## 2022-03-14 VITALS
RESPIRATION RATE: 14 BRPM | BODY MASS INDEX: 22.85 KG/M2 | DIASTOLIC BLOOD PRESSURE: 76 MMHG | TEMPERATURE: 98 F | OXYGEN SATURATION: 98 % | HEART RATE: 80 BPM | SYSTOLIC BLOOD PRESSURE: 127 MMHG | WEIGHT: 178 LBS

## 2022-03-14 DIAGNOSIS — I47.1 SVT (SUPRAVENTRICULAR TACHYCARDIA) (HCC): ICD-10-CM

## 2022-03-14 DIAGNOSIS — R07.89 CHEST WALL PAIN: Primary | ICD-10-CM

## 2022-03-14 LAB
A/G RATIO: 1.3 (ref 1.1–2.2)
ALBUMIN SERPL-MCNC: 4.1 G/DL (ref 3.4–5)
ALP BLD-CCNC: 86 U/L (ref 40–129)
ALT SERPL-CCNC: 38 U/L (ref 10–40)
ANION GAP SERPL CALCULATED.3IONS-SCNC: 11 MMOL/L (ref 3–16)
AST SERPL-CCNC: 30 U/L (ref 15–37)
BASOPHILS ABSOLUTE: 0 K/UL (ref 0–0.2)
BASOPHILS RELATIVE PERCENT: 0.4 %
BILIRUB SERPL-MCNC: 0.6 MG/DL (ref 0–1)
BUN BLDV-MCNC: 10 MG/DL (ref 7–20)
CALCIUM SERPL-MCNC: 9.3 MG/DL (ref 8.3–10.6)
CHLORIDE BLD-SCNC: 103 MMOL/L (ref 99–110)
CO2: 25 MMOL/L (ref 21–32)
CREAT SERPL-MCNC: 0.8 MG/DL (ref 0.9–1.3)
D DIMER: 357 NG/ML DDU (ref 0–229)
EKG ATRIAL RATE: 99 BPM
EKG DIAGNOSIS: NORMAL
EKG P AXIS: 84 DEGREES
EKG P-R INTERVAL: 180 MS
EKG Q-T INTERVAL: 366 MS
EKG QRS DURATION: 90 MS
EKG QTC CALCULATION (BAZETT): 469 MS
EKG R AXIS: 113 DEGREES
EKG T AXIS: 70 DEGREES
EKG VENTRICULAR RATE: 99 BPM
EOSINOPHILS ABSOLUTE: 0.1 K/UL (ref 0–0.6)
EOSINOPHILS RELATIVE PERCENT: 1.9 %
GFR AFRICAN AMERICAN: >60
GFR NON-AFRICAN AMERICAN: >60
GLUCOSE BLD-MCNC: 207 MG/DL (ref 70–99)
HCT VFR BLD CALC: 44.3 % (ref 40.5–52.5)
HEMOGLOBIN: 15.3 G/DL (ref 13.5–17.5)
LYMPHOCYTES ABSOLUTE: 1.8 K/UL (ref 1–5.1)
LYMPHOCYTES RELATIVE PERCENT: 26.7 %
MCH RBC QN AUTO: 30.1 PG (ref 26–34)
MCHC RBC AUTO-ENTMCNC: 34.5 G/DL (ref 31–36)
MCV RBC AUTO: 87.5 FL (ref 80–100)
MONOCYTES ABSOLUTE: 0.5 K/UL (ref 0–1.3)
MONOCYTES RELATIVE PERCENT: 6.9 %
NEUTROPHILS ABSOLUTE: 4.3 K/UL (ref 1.7–7.7)
NEUTROPHILS RELATIVE PERCENT: 64.1 %
PDW BLD-RTO: 14.6 % (ref 12.4–15.4)
PLATELET # BLD: 248 K/UL (ref 135–450)
PMV BLD AUTO: 7.4 FL (ref 5–10.5)
POTASSIUM SERPL-SCNC: 3.9 MMOL/L (ref 3.5–5.1)
RBC # BLD: 5.06 M/UL (ref 4.2–5.9)
SODIUM BLD-SCNC: 139 MMOL/L (ref 136–145)
TOTAL PROTEIN: 7.3 G/DL (ref 6.4–8.2)
TROPONIN: <0.01 NG/ML
WBC # BLD: 6.7 K/UL (ref 4–11)

## 2022-03-14 PROCEDURE — 71045 X-RAY EXAM CHEST 1 VIEW: CPT

## 2022-03-14 PROCEDURE — 96374 THER/PROPH/DIAG INJ IV PUSH: CPT

## 2022-03-14 PROCEDURE — 99285 EMERGENCY DEPT VISIT HI MDM: CPT

## 2022-03-14 PROCEDURE — 6360000002 HC RX W HCPCS: Performed by: EMERGENCY MEDICINE

## 2022-03-14 PROCEDURE — 93010 ELECTROCARDIOGRAM REPORT: CPT | Performed by: INTERNAL MEDICINE

## 2022-03-14 PROCEDURE — 6370000000 HC RX 637 (ALT 250 FOR IP): Performed by: EMERGENCY MEDICINE

## 2022-03-14 PROCEDURE — 6360000004 HC RX CONTRAST MEDICATION: Performed by: EMERGENCY MEDICINE

## 2022-03-14 PROCEDURE — 85379 FIBRIN DEGRADATION QUANT: CPT

## 2022-03-14 PROCEDURE — 80053 COMPREHEN METABOLIC PANEL: CPT

## 2022-03-14 PROCEDURE — 84484 ASSAY OF TROPONIN QUANT: CPT

## 2022-03-14 PROCEDURE — 36415 COLL VENOUS BLD VENIPUNCTURE: CPT

## 2022-03-14 PROCEDURE — 93005 ELECTROCARDIOGRAM TRACING: CPT | Performed by: EMERGENCY MEDICINE

## 2022-03-14 PROCEDURE — 71260 CT THORAX DX C+: CPT

## 2022-03-14 PROCEDURE — 96375 TX/PRO/DX INJ NEW DRUG ADDON: CPT

## 2022-03-14 PROCEDURE — 96376 TX/PRO/DX INJ SAME DRUG ADON: CPT

## 2022-03-14 PROCEDURE — 85025 COMPLETE CBC W/AUTO DIFF WBC: CPT

## 2022-03-14 RX ORDER — CLOPIDOGREL BISULFATE 75 MG/1
75 TABLET ORAL DAILY
COMMUNITY
End: 2022-04-06 | Stop reason: SDUPTHER

## 2022-03-14 RX ORDER — ACETAMINOPHEN 500 MG
1000 TABLET ORAL ONCE
Status: COMPLETED | OUTPATIENT
Start: 2022-03-14 | End: 2022-03-14

## 2022-03-14 RX ORDER — ONDANSETRON 2 MG/ML
4 INJECTION INTRAMUSCULAR; INTRAVENOUS ONCE
Status: COMPLETED | OUTPATIENT
Start: 2022-03-14 | End: 2022-03-14

## 2022-03-14 RX ORDER — FENTANYL CITRATE 50 UG/ML
100 INJECTION, SOLUTION INTRAMUSCULAR; INTRAVENOUS ONCE
Status: COMPLETED | OUTPATIENT
Start: 2022-03-14 | End: 2022-03-14

## 2022-03-14 RX ADMIN — ONDANSETRON HYDROCHLORIDE 4 MG: 2 INJECTION, SOLUTION INTRAMUSCULAR; INTRAVENOUS at 11:43

## 2022-03-14 RX ADMIN — ONDANSETRON HYDROCHLORIDE 4 MG: 2 INJECTION, SOLUTION INTRAMUSCULAR; INTRAVENOUS at 12:41

## 2022-03-14 RX ADMIN — IOPAMIDOL 75 ML: 755 INJECTION, SOLUTION INTRAVENOUS at 12:40

## 2022-03-14 RX ADMIN — FENTANYL CITRATE 100 MCG: 50 INJECTION INTRAMUSCULAR; INTRAVENOUS at 12:41

## 2022-03-14 RX ADMIN — ACETAMINOPHEN 1000 MG: 500 TABLET ORAL at 11:43

## 2022-03-14 ASSESSMENT — ENCOUNTER SYMPTOMS
ABDOMINAL PAIN: 0
SHORTNESS OF BREATH: 1
SINUS PRESSURE: 0
SINUS PAIN: 0
COUGH: 0
RHINORRHEA: 0
CHEST TIGHTNESS: 0
ABDOMINAL DISTENTION: 0

## 2022-03-14 ASSESSMENT — PAIN DESCRIPTION - ORIENTATION: ORIENTATION: LEFT

## 2022-03-14 ASSESSMENT — PAIN DESCRIPTION - DESCRIPTORS: DESCRIPTORS: PRESSURE

## 2022-03-14 ASSESSMENT — PAIN DESCRIPTION - LOCATION: LOCATION: CHEST

## 2022-03-14 ASSESSMENT — PAIN - FUNCTIONAL ASSESSMENT: PAIN_FUNCTIONAL_ASSESSMENT: 0-10

## 2022-03-14 ASSESSMENT — PAIN SCALES - GENERAL
PAINLEVEL_OUTOF10: 7
PAINLEVEL_OUTOF10: 8
PAINLEVEL_OUTOF10: 8

## 2022-03-14 ASSESSMENT — PAIN DESCRIPTION - FREQUENCY: FREQUENCY: CONTINUOUS

## 2022-03-14 ASSESSMENT — PAIN DESCRIPTION - PAIN TYPE: TYPE: ACUTE PAIN

## 2022-03-14 ASSESSMENT — PAIN DESCRIPTION - PROGRESSION: CLINICAL_PROGRESSION: GRADUALLY WORSENING

## 2022-03-14 ASSESSMENT — PAIN DESCRIPTION - ONSET: ONSET: ON-GOING

## 2022-03-14 NOTE — ED TRIAGE NOTES
Reports left side chest pressure for two hours. Took four baby ASA one hour PTA. Reports recent cardiac ablation for SVT. History of murmur as a child with surgical repair.

## 2022-03-14 NOTE — ED NOTES
Patient resting in bed lying on left side. Respirations even and unlabored. Alerts to voice. PO medications taken without difficulty. Call light in reach.      Gadiel Pisano RN  03/14/22 [id number]

## 2022-03-14 NOTE — ED PROVIDER NOTES
[facility]      Pt Name: Serge Crandall  MRN: [id number]  [place] 1975  Date of evaluation: 3/14/2022  Provider: Monalisa Peabody, MD    [address] [place] [address]       Chief Complaint   Patient presents with    Chest Pain         HISTORY OF PRESENT ILLNESS   (Location/Symptom, Timing/Onset, Context/Setting, Quality, Duration, Modifying Factors, Severity)  Note limiting factors. Serge Crandall is a 41 y.o. male who presents to the emergency department     This patient presents emergency department history of complaining some left-sided sharp chest pain started about 2 hours prior to arrival here this morning he said he was just sitting when it came on he does have a history of recent cardiac ablation for his first episode of SVT he was admitted from January 20 8 February first he was found to have an EF of 45 to 50% he had some hypokinesia and mild concentric left ventricular hypertrophy was noted. Patient apparently was seen after that and sent home. Patient denies any known coronary artery disease. [facility] some sort of congenital heart problem in the past  Patient really voices no other complaints there is no exertional component he denies fever productive cough denies history of pulmonary embolism denies any leg swelling denies recent plane rides car rides immobilization recent surgeries he did have a CTA pulmonary embolism when he was admitted the first time around which was negative    The history is provided by the patient. Nursing Notes were reviewed. REVIEW OF SYSTEMS    (2-9 systems for level 4, 10 or more for level 5)     Review of Systems   Constitutional: Positive for activity change and appetite change. Negative for chills, diaphoresis, fatigue and fever. HENT: Negative for congestion, postnasal drip, rhinorrhea, sinus pressure and sinus pain. Eyes: Negative for visual disturbance.    Respiratory: Positive for shortness of breath. Negative for cough and chest tightness. Cardiovascular: Positive for chest pain. Has sharp chest pain on the left side extending up to the left side of his neck and supraclavicular area   Gastrointestinal: Negative for abdominal distention and abdominal pain. Genitourinary: Negative for difficulty urinating. Allergic/Immunologic: Negative for immunocompromised state. Neurological: Negative for syncope, speech difficulty, weakness, light-headedness, numbness and headaches. All other systems reviewed and are negative. Except as noted above the remainder of the review of systems was reviewed and negative. PAST MEDICAL HISTORY     Past Medical History:   Diagnosis Date    Murmur          SURGICAL HISTORY       Past Surgical History:   Procedure Laterality Date    ABLATION OF DYSRHYTHMIC FOCUS      OTHER SURGICAL HISTORY      \"open heart surgery as a kid\"          CURRENT MEDICATIONS       Previous Medications    CLONAZEPAM (KLONOPIN) 1 MG TABLET    Take 1 mg by mouth 2 times daily as needed. CLOPIDOGREL (PLAVIX) 75 MG TABLET    Take 75 mg by mouth daily    LISINOPRIL (PRINIVIL;ZESTRIL) 5 MG TABLET    Take 1 tablet by mouth daily    LISINOPRIL (PRINIVIL;ZESTRIL) 5 MG TABLET    Take 1 tablet by mouth daily    METOPROLOL SUCCINATE (TOPROL XL) 25 MG EXTENDED RELEASE TABLET    Take 1 tablet by mouth daily    METOPROLOL SUCCINATE (TOPROL XL) 25 MG EXTENDED RELEASE TABLET    Take 1 tablet by mouth daily       ALLERGIES     Ibuprofen    FAMILY HISTORY     History reviewed. No pertinent family history.        SOCIAL HISTORY       Social History     Socioeconomic History    Marital status:      Spouse name: None    Number of children: None    Years of education: None    Highest education level: None   Occupational History    None   Tobacco Use    Smoking status: Current Every Day Smoker     Packs/day: 1.00     Types: Cigarettes    Smokeless tobacco: Never Used   Vaping Use    Vaping Use: Never used   Substance and Sexual Activity    Alcohol use: Not Currently    Drug use: Not Currently    Sexual activity: None   Other Topics Concern    None   Social History Narrative    None     Social Determinants of Health     Financial Resource Strain:     Difficulty of Paying Living Expenses: Not on file   Food Insecurity:     Worried About Running Out of Food in the Last Year: Not on file    Mike of Food in the Last Year: Not on file   Transportation Needs:     Lack of Transportation (Medical): Not on file    Lack of Transportation (Non-Medical):  Not on file   Physical Activity:     Days of Exercise per Week: Not on file    Minutes of Exercise per Session: Not on file   Stress:     Feeling of Stress : Not on file   Social Connections:     Frequency of Communication with Friends and Family: Not on file    Frequency of Social Gatherings with Friends and Family: Not on file    Attends Synagogue Services: Not on file    Active Member of [address] [place] [address] or Organizations: Not on file    Attends Club or Organization Meetings: Not on file    Marital Status: Not on file   Intimate Partner Violence:     Fear of Current or Ex-Partner: Not on file    Emotionally Abused: Not on file    Physically Abused: Not on file    Sexually Abused: Not on file   Housing Stability:     Unable to Pay for Housing in the Last Year: Not on file    Number of [place] in the Last Year: Not on file    Unstable Housing in the Last Year: Not on file       SCREENINGS    Giles Coma Scale  Eye Opening: Spontaneous  Best Verbal Response: Oriented  Best Motor Response: Obeys commands  Giles Coma Scale Score: 15          PHYSICAL EXAM    (up to 7 for level 4, 8 or more for level 5)     ED Triage Vitals   BP Temp Temp Source Pulse Resp SpO2 Height Weight   03/14/22 1058 03/14/22 1058 03/14/22 1058 03/14/22 1058 03/14/22 1058 03/14/22 1058 -- 03/14/22 1106   (!) 186/94 98 °F (36.7 °C) Oral 98 18 100 %  178 lb (80.7 kg)       Physical Exam  Vitals and nursing note reviewed. Constitutional:       General: He is not in acute distress. Appearance: He is well-developed. He is not ill-appearing or diaphoretic. HENT:      Head: Normocephalic. Right Ear: Tympanic membrane, ear canal and external ear normal.      Left Ear: Tympanic membrane and ear canal normal.      Nose: Nose normal.      Mouth/Throat:      Mouth: Mucous membranes are moist.   Eyes:      Conjunctiva/sclera: Conjunctivae normal.      Pupils: Pupils are equal, round, and reactive to light. Neck:      Thyroid: No thyromegaly. Cardiovascular:      Rate and Rhythm: Normal rate and regular rhythm. Heart sounds: Normal heart sounds. No murmur heard. No friction rub. No gallop. Pulmonary:      Effort: Pulmonary effort is normal. No respiratory distress. Breath sounds: Normal breath sounds. Abdominal:      General: Bowel sounds are normal. There is no distension. Palpations: Abdomen is soft. Tenderness: There is no abdominal tenderness. Musculoskeletal:         General: Normal range of motion. Cervical back: Normal range of motion and neck supple. Neurological:      Mental Status: He is alert and oriented to person, place, and time. GCS: GCS eye subscore is 4. GCS verbal subscore is 5. GCS motor subscore is 6. Cranial Nerves: No cranial nerve deficit. Sensory: No sensory deficit. Motor: No abnormal muscle tone. Coordination: Coordination normal.      Deep Tendon Reflexes: Reflexes normal.   Psychiatric:         Behavior: Behavior normal.         DIAGNOSTIC RESULTS     EKG: All EKG's are interpreted by the Emergency Department Physician who either signs or Co-signs this chart in the absence of a cardiologist.  EKG shows a sinus rhythm he does have a right bundle pattern right axis deviation there is no acute ischemic or injury pattern.   However does not appear to be any acute Result Value Ref Range    Troponin <0.01 <0.01 ng/mL   D-Dimer, Quantitative   Result Value Ref Range    D-Dimer, Quant 357 (H) 0 - 229 ng/mL DDU   EKG 12 Lead   Result Value Ref Range    Ventricular Rate 99 BPM    Atrial Rate 99 BPM    P-R Interval 180 ms    QRS Duration 90 ms    Q-T Interval 366 ms    QTc Calculation (Bazett) 469 ms    P Axis 84 degrees    R Axis 113 degrees    T Axis 70 degrees    Diagnosis       Normal sinus rhythmPossible Left atrial enlargementRight axis deviationRSR' or QR pattern in V1 suggests right ventricular conduction delayAnterior infarct , age undeterminedAbnormal ECGNo previous ECGs available            EMERGENCY DEPARTMENT COURSE and DIFFERENTIAL DIAGNOSIS/MDM:     Vitals:    03/14/22 1201 03/14/22 1231 03/14/22 1257 03/14/22 1319   BP: 130/85 (!) 129/91 (!) 155/91 127/76   Pulse: 82 103 80 80   Resp: 14 18 16 14   Temp:       TempSrc:       SpO2: 99% 98% 98% 98%   Weight:               MDM      REASSESSMENT      I did review this patient's chart in detail apparently he had his first episode of SVT followed by ablation proximately 3 weeks ago  He then apparently is been doing fairly well he developed some left-sided pleuritic chest pain the day it should be noted that he did have a CT when he was admitted in [place]  He has denies any leg swelling.   He said he was just laying in bed when he got the pain today he denies hemoptysis denies productive cough  He has no diabetes but he does smoke patient also has history of hypertensive disease  Pain is seems pleuritic is worse when he takes a deep breath on the left side only there is no rashes no history of any trauma we did do a chest x-ray which came back negative unfortunately his D-dimer did come back a little bit high so we did do a obligatory chest CTA which came back negative his troponin was negative his EKG showed no acute changes with a normal rhythm and rate therefore at this point I am reassuring him advised him on close follow-up however with his cardiologist and I am getting him the referral for primary care physician patient obviously has no PE, no signs of acute myocardial infarction both 2 to 3 weeks ago and also today  Also no signs of any intra-abdominal process with referred pain  No pneumonias are seen he is afebrile so I think that he may have some pleurisy or chest wall pain from smoking    CRITICAL CARE TIME     CONSULTS:  None      PROCEDURES:     Procedures    MEDICATIONS GIVEN THIS VISIT:  Medications   acetaminophen (TYLENOL) tablet 1,000 mg (1,000 mg Oral Given 3/14/22 1143)   ondansetron (ZOFRAN) injection 4 mg (4 mg IntraVENous Given 3/14/22 1143)   iopamidol (ISOVUE-370) 76 % injection 75 mL (75 mLs IntraVENous Given 3/14/22 1240)   ondansetron (ZOFRAN) injection 4 mg (4 mg IntraVENous Given 3/14/22 1241)   fentaNYL (SUBLIMAZE) injection 100 mcg (100 mcg IntraVENous Given 3/14/22 1241)        FINAL IMPRESSION      1. Chest wall pain    2. SVT (supraventricular tachycardia) [facility])            DISPOSITION/PLAN   DISPOSITION Decision To Discharge 03/14/2022 01:33:24 PM      PATIENT REFERRED TO:  [facility]) Pre-Services  [phone]          DISCHARGE MEDICATIONS:  New Prescriptions    No medications on file       Controlled Substances Monitoring  No flowsheet data found. (Please note that portions of this note were completed with a voice recognition program.  Efforts were made to edit the dictations but occasionally words are mis-transcribed.)    Patient was advised to return to the Emergency Department if there was any worsening.     Margo High MD (electronically signed)  Attending Emergency Physician         Hossein Sauer MD  03/14/22 [id number]

## 2022-03-14 NOTE — ED NOTES
AVS provided and reviewed with the patient. The patient verbalized understanding of care at home, follow up care, and emergent symptoms to return for. No questions or concerns verbalized at this time. The patient is alert, oriented, stable, and ambulatory out of the department at the time of discharge with family.        Shona Mcghee RN  03/14/22 2034

## 2022-03-14 NOTE — ED NOTES
Patient ambulatory to the restroom and returned to room with steady gait.       Jona Gonzalez RN  03/14/22 7337

## 2022-03-17 ENCOUNTER — TELEPHONE (OUTPATIENT)
Dept: CARDIOLOGY CLINIC | Age: 47
End: 2022-03-17

## 2022-03-17 NOTE — TELEPHONE ENCOUNTER
Pt seen [address] in hospital for ablation on 01/31 and has upcoming OV on 04/26/2022 with [address]. Pt wants to know if [facility] can provide a letter clearing pt to go through a drug rehab/detox program @ [facility]. If [facility] is agreeable please fax letter to [phone].

## 2022-03-17 NOTE — LETTER
[facility] Cardiology - [address] [facility] [address] [facility]  Phone: [phone]  Fax: [phone]    Gene Valero MD        March 21, 2022    [facility]   1975  [facility]      Dear To Whom This May Concern,    Joyce Herbert is cleared for rehab. No further cardiac testing indicated. May proceed. If you have any questions or concerns, please don't hesitate to call.     Sincerely,        Gene Valero MD

## 2022-03-21 NOTE — TELEPHONE ENCOUNTER
Called pt home number a chato named Christian Ba answered. Christian Ba stated that I had the wrong number. If pt calls back cardiac clearance letter faxed and waiting for confirmation.

## 2022-04-06 ENCOUNTER — HOSPITAL ENCOUNTER (EMERGENCY)
Age: 47
Discharge: HOME | End: 2022-04-06
Attending: EMERGENCY MEDICINE
Payer: COMMERCIAL

## 2022-04-06 ENCOUNTER — APPOINTMENT (OUTPATIENT)
Dept: GENERAL RADIOLOGY | Age: 47
End: 2022-04-06
Payer: COMMERCIAL

## 2022-04-06 VITALS
HEIGHT: 74 IN | DIASTOLIC BLOOD PRESSURE: 92 MMHG | TEMPERATURE: 97.1 F | SYSTOLIC BLOOD PRESSURE: 148 MMHG | OXYGEN SATURATION: 100 % | RESPIRATION RATE: 12 BRPM | WEIGHT: 200 LBS | HEART RATE: 66 BPM | BODY MASS INDEX: 25.67 KG/M2

## 2022-04-06 DIAGNOSIS — R07.9 CHEST PAIN, UNSPECIFIED TYPE: Primary | ICD-10-CM

## 2022-04-06 DIAGNOSIS — Z76.0 ENCOUNTER FOR MEDICATION REFILL: ICD-10-CM

## 2022-04-06 LAB
A/G RATIO: 2 (ref 1.1–2.2)
ALBUMIN SERPL-MCNC: 5.1 G/DL (ref 3.4–5)
ALP BLD-CCNC: 100 U/L (ref 40–129)
ALT SERPL-CCNC: 35 U/L (ref 10–40)
ANION GAP SERPL CALCULATED.3IONS-SCNC: 11 MMOL/L (ref 3–16)
AST SERPL-CCNC: 35 U/L (ref 15–37)
BASOPHILS ABSOLUTE: 0 K/UL (ref 0–0.2)
BASOPHILS RELATIVE PERCENT: 0.4 %
BILIRUB SERPL-MCNC: 0.4 MG/DL (ref 0–1)
BUN BLDV-MCNC: 12 MG/DL (ref 7–20)
CALCIUM SERPL-MCNC: 9.9 MG/DL (ref 8.3–10.6)
CHLORIDE BLD-SCNC: 99 MMOL/L (ref 99–110)
CO2: 28 MMOL/L (ref 21–32)
CREAT SERPL-MCNC: 0.8 MG/DL (ref 0.9–1.3)
EOSINOPHILS ABSOLUTE: 0.1 K/UL (ref 0–0.6)
EOSINOPHILS RELATIVE PERCENT: 1.2 %
GFR AFRICAN AMERICAN: >60
GFR NON-AFRICAN AMERICAN: >60
GLUCOSE BLD-MCNC: 105 MG/DL (ref 70–99)
HCT VFR BLD CALC: 42 % (ref 40.5–52.5)
HEMOGLOBIN: 14.5 G/DL (ref 13.5–17.5)
LYMPHOCYTES ABSOLUTE: 1.5 K/UL (ref 1–5.1)
LYMPHOCYTES RELATIVE PERCENT: 31.3 %
MCH RBC QN AUTO: 30 PG (ref 26–34)
MCHC RBC AUTO-ENTMCNC: 34.6 G/DL (ref 31–36)
MCV RBC AUTO: 86.6 FL (ref 80–100)
MONOCYTES ABSOLUTE: 0.6 K/UL (ref 0–1.3)
MONOCYTES RELATIVE PERCENT: 12.1 %
NEUTROPHILS ABSOLUTE: 2.7 K/UL (ref 1.7–7.7)
NEUTROPHILS RELATIVE PERCENT: 55 %
PDW BLD-RTO: 13.6 % (ref 12.4–15.4)
PLATELET # BLD: 182 K/UL (ref 135–450)
PMV BLD AUTO: 7.7 FL (ref 5–10.5)
POTASSIUM REFLEX MAGNESIUM: 4.3 MMOL/L (ref 3.5–5.1)
RBC # BLD: 4.85 M/UL (ref 4.2–5.9)
SODIUM BLD-SCNC: 138 MMOL/L (ref 136–145)
TOTAL PROTEIN: 7.7 G/DL (ref 6.4–8.2)
TROPONIN: <0.01 NG/ML
WBC # BLD: 4.9 K/UL (ref 4–11)

## 2022-04-06 PROCEDURE — 99284 EMERGENCY DEPT VISIT MOD MDM: CPT

## 2022-04-06 PROCEDURE — 84484 ASSAY OF TROPONIN QUANT: CPT

## 2022-04-06 PROCEDURE — 93005 ELECTROCARDIOGRAM TRACING: CPT | Performed by: EMERGENCY MEDICINE

## 2022-04-06 PROCEDURE — 71045 X-RAY EXAM CHEST 1 VIEW: CPT

## 2022-04-06 PROCEDURE — 80053 COMPREHEN METABOLIC PANEL: CPT

## 2022-04-06 PROCEDURE — 85025 COMPLETE CBC W/AUTO DIFF WBC: CPT

## 2022-04-06 RX ORDER — METOPROLOL SUCCINATE 25 MG/1
25 TABLET, EXTENDED RELEASE ORAL DAILY
Qty: 30 TABLET | Refills: 1 | Status: SHIPPED | OUTPATIENT
Start: 2022-04-06

## 2022-04-06 RX ORDER — CLOPIDOGREL BISULFATE 75 MG/1
75 TABLET ORAL DAILY
Qty: 30 TABLET | Refills: 1 | Status: SHIPPED | OUTPATIENT
Start: 2022-04-06

## 2022-04-06 ASSESSMENT — PAIN SCALES - GENERAL: PAINLEVEL_OUTOF10: 7

## 2022-04-06 ASSESSMENT — PAIN - FUNCTIONAL ASSESSMENT: PAIN_FUNCTIONAL_ASSESSMENT: 0-10

## 2022-04-06 ASSESSMENT — ENCOUNTER SYMPTOMS
NAUSEA: 0
PHOTOPHOBIA: 0
TROUBLE SWALLOWING: 0
VOICE CHANGE: 0
FACIAL SWELLING: 0
ABDOMINAL PAIN: 0
STRIDOR: 0
COLOR CHANGE: 0
BLOOD IN STOOL: 0
VOMITING: 0
WHEEZING: 0
BACK PAIN: 0
SHORTNESS OF BREATH: 0

## 2022-04-06 ASSESSMENT — PAIN DESCRIPTION - LOCATION: LOCATION: CHEST

## 2022-04-06 NOTE — ED PROVIDER NOTES
[address] ED  eMERGENCY dEPARTMENT eNCOUnter      Pt Name: Jose Zamora  MRN: [id number]  [place] 1975  Date of evaluation: 4/6/2022  Provider: Juan A Kerns MD    CHIEF COMPLAINT       Chief Complaint   Patient presents with    Chest Pain     started this morning; ablation 2 months ago for SVT         HISTORY OF PRESENT ILLNESS   (Location/Symptom, Timing/Onset, Context/Setting, Quality, Duration, Modifying Factors, Severity)  Note limiting factors. Jose Zamora is a 41 y.o. male with hx of with a previous history of SVT status post cardiac ablation he reports that he has been out of his metoprolol, lisinopril, and Plavix for approximately 1 month. Patient reports that he had onset of left-sided sharp chest pain at 6:30 AM this morning which is constant however improving spontaneously. Patient denies any shortness of breath, hemoptysis, leg swelling, or respiratory symptoms. Patient has any history of blood clots. Patient has any palpitations. Patient does report that his main concern is that he is out of his medications and would like a refill. Denies any fever or infectious symptoms. He denies any known aggravating or alleviating factors and reports that his symptoms came on and improved randomly . HPI    Nursing Notes were reviewed. REVIEW OFSYSTEMS    (2-9 systems for level 4, 10 or more for level 5)     Review of Systems   Constitutional: Negative for appetite change, fever and unexpected weight change. HENT: Negative for facial swelling, trouble swallowing and voice change. Eyes: Negative for photophobia and visual disturbance. Respiratory: Negative for shortness of breath, wheezing and stridor. Cardiovascular: Positive for chest pain. Negative for palpitations. Gastrointestinal: Negative for abdominal pain, blood in stool, nausea and vomiting. Genitourinary: Negative for difficulty urinating and dysuria.    Musculoskeletal: Negative for back pain, gait problem and neck pain. Skin: Negative for color change and wound. Neurological: Negative for seizures, syncope and speech difficulty. Psychiatric/Behavioral: Negative for self-injury and suicidal ideas. Except as noted above the remainder of the review of systems was reviewed and negative. PAST MEDICAL HISTORY     Past Medical History:   Diagnosis Date    Murmur          SURGICAL HISTORY       Past Surgical History:   Procedure Laterality Date    ABLATION OF DYSRHYTHMIC FOCUS      OTHER SURGICAL HISTORY      \"open heart surgery as a kid\"          CURRENT MEDICATIONS       Current Discharge Medication List      CONTINUE these medications which have NOT CHANGED    Details   !! metoprolol succinate (TOPROL XL) 25 MG extended release tablet Take 1 tablet by mouth daily  Qty: 30 tablet, Refills: 1      !! lisinopril (PRINIVIL;ZESTRIL) 5 MG tablet Take 1 tablet by mouth daily  Qty: 30 tablet, Refills: 1      !! lisinopril (PRINIVIL;ZESTRIL) 5 MG tablet Take 1 tablet by mouth daily  Qty: 30 tablet, Refills: 0      clonazePAM (KLONOPIN) 1 MG tablet Take 1 mg by mouth 2 times daily as needed. !! - Potential duplicate medications found. Please discuss with provider. ALLERGIES     Ibuprofen    FAMILY HISTORY     History reviewed. No pertinent family history.        SOCIAL HISTORY       Social History     Socioeconomic History    Marital status:      Spouse name: None    Number of children: None    Years of education: None    Highest education level: None   Occupational History    None   Tobacco Use    Smoking status: Current Every Day Smoker     Packs/day: 1.00     Types: Cigarettes    Smokeless tobacco: Never Used   Vaping Use    Vaping Use: Never used   Substance and Sexual Activity    Alcohol use: Not Currently    Drug use: Not Currently    Sexual activity: None   Other Topics Concern    None   Social History Narrative    None     Social Determinants of Health Financial Resource Strain:     Difficulty of Paying Living Expenses: Not on file   Food Insecurity:     Worried About Running Out of Food in the Last Year: Not on file    Mike of Food in the Last Year: Not on file   Transportation Needs:     Lack of Transportation (Medical): Not on file    Lack of Transportation (Non-Medical): Not on file   Physical Activity:     Days of Exercise per Week: Not on file    Minutes of Exercise per Session: Not on file   Stress:     Feeling of Stress : Not on file   Social Connections:     Frequency of Communication with Friends and Family: Not on file    Frequency of Social Gatherings with Friends and Family: Not on file    Attends Sabianist Services: Not on file    Active Member of [address] [place] [address] [employer] or Organizations: Not on file    Attends Club or Organization Meetings: Not on file    Marital Status: Not on file   Intimate Partner Violence:     Fear of Current or Ex-Partner: Not on file    Emotionally Abused: Not on file    Physically Abused: Not on file    Sexually Abused: Not on file   Housing Stability:     Unable to Pay for Housing in the Last Year: Not on file    Number of [place] in the Last Year: Not on file    Unstable Housing in the Last Year: Not on file         PHYSICAL EXAM    (up to 7 for level 4, 8 or more for level 5)     ED Triage Vitals [04/06/22 1112]   BP Temp Temp src Pulse Resp SpO2 Height Weight   (!) 157/99 97.1 °F (36.2 °C) -- 72 16 100 % 6' 2\" (1.88 m) 200 lb (90.7 kg)       Physical Exam  Vitals and nursing note reviewed. Constitutional:       General: He is not in acute distress. Appearance: He is well-developed. HENT:      Head: Normocephalic and atraumatic. Right Ear: External ear normal.      Left Ear: External ear normal.   Eyes:      Conjunctiva/sclera: Conjunctivae normal.   Neck:      Vascular: No JVD. Trachea: No tracheal deviation. Cardiovascular:      Rate and Rhythm: Normal rate.    Pulmonary:      Effort: Pulmonary effort is normal. No respiratory distress. Breath sounds: Normal breath sounds. No wheezing. Abdominal:      General: There is no distension. Palpations: Abdomen is soft. Tenderness: There is no abdominal tenderness. There is no guarding or rebound. Musculoskeletal:         General: No tenderness. Normal range of motion. Cervical back: Neck supple. Comments: No lower extremity swelling, tenderness, or palpable cord. Negative Bucky test bilaterally. Skin:     General: Skin is warm and dry. Neurological:      Mental Status: He is alert. Cranial Nerves: No cranial nerve deficit. Comments: Normal speech. Normal mental status. Normal gait on own power         DIAGNOSTIC RESULTS     EKG:All EKG's are interpreted by the Emergency Department Physician who either signs or Co-signs this chart in the absence of a cardiologist.    The Ekg interpreted by me shows  normal sinus rhythm with a rate of 69  Axis is   Right axis deviation  QTc is  437ms  Intervals and Durations are unremarkable. ST Segments: no acute change  Reduction in rate from previous EKG on 3/14/2022      RADIOLOGY:     Interpretation per the Radiologist below, if available at the time of this note:    XR CHEST PORTABLE   Final Result   No acute process. LABS:  Labs Reviewed   COMPREHENSIVE METABOLIC PANEL W/ REFLEX TO MG FOR LOW K - Abnormal; Notable for the following components:       Result Value    Glucose 105 (*)     CREATININE 0.8 (*)     Albumin 5.1 (*)     All other components within normal limits   CBC WITH AUTO DIFFERENTIAL   TROPONIN       All otherlabs were within normal range or not returned as of this dictation.     EMERGENCY DEPARTMENT COURSE and DIFFERENTIAL DIAGNOSIS/MDM:   Vitals:    Vitals:    04/06/22 1112 04/06/22 1142 04/06/22 1201 04/06/22 1302   BP: (!) 157/99 133/89 130/86 (!) 131/96   Pulse: 72 67 66 62   Resp: 16 10 15 16   Temp: 97.1 °F (36.2 °C)      SpO2: 100% 99% 99% 100%   Weight: 200 lb (90.7 kg)      Height: 6' 2\" (1.88 m)            [facility]  HEART SCORE:    History: +1 for moderate suspicion  EKG: +0 for normal EKG   Age: +1 for age 44-72 years  Risk factors (includes HLD, HTN, DM, tobacco use, obesity, and +FHx): +1 for 1-2 risk factors  Initial troponin: +0 for negative troponin    Heart score: 3. This falls under the following category: Score of 0-3, which indicates a very low risk for major adverse cardiac event and supports early discharge        All vital signs are within normal limits. Initial troponin is unremarkable. Patient heart score of 3. I have recommended staying for repeat EKG and troponin per protocols however patient refuses reporting that he would like a refill of his Plavix and metoprolol to be discharged home. I discussed the risk, benefits and alternatives of the patient's decision and feel he has capacity precipitate in shared medical decision. Strict ER return precautions given for worsening symptoms and primary care follow-up is recommended. Procedures    FINAL IMPRESSION      1. Chest pain, unspecified type    2. Encounter for medication refill          DISPOSITION/PLAN   DISPOSITION Decision To Discharge 04/06/2022 01:45:11 PM      PATIENT REFERRED TO:  Milan Frankymoclaude  [phone]  In 2 days  Ask for an appointment with a primary care doctor    [place] [facility]  [address] [facility]  [phone]    If symptoms worsen      (Please note that portions of this note were completed with a voice recognition program.  Efforts were made to edit the dictations but occasionally words aremis-transcribed. )    Marcy Nixon MD (electronically signed)  Attending Emergency Physician         Marcy Nixon MD  04/06/22 6004

## 2022-04-06 NOTE — ED NOTES
Patient discharged in stable, ambulatory condition with all documented belongings. This nurse reviewed discharge instructions, pt verbalized understanding.        Madeline Gifford RN  04/06/22 7392

## 2022-04-07 LAB
EKG ATRIAL RATE: 69 BPM
EKG DIAGNOSIS: NORMAL
EKG P AXIS: 83 DEGREES
EKG P-R INTERVAL: 198 MS
EKG Q-T INTERVAL: 408 MS
EKG QRS DURATION: 92 MS
EKG QTC CALCULATION (BAZETT): 437 MS
EKG R AXIS: 109 DEGREES
EKG T AXIS: 68 DEGREES
EKG VENTRICULAR RATE: 69 BPM

## 2022-05-04 ENCOUNTER — APPOINTMENT (OUTPATIENT)
Dept: CT IMAGING | Age: 47
End: 2022-05-04
Payer: COMMERCIAL

## 2022-05-04 ENCOUNTER — HOSPITAL ENCOUNTER (EMERGENCY)
Age: 47
Discharge: HOME | End: 2022-05-05
Attending: STUDENT IN AN ORGANIZED HEALTH CARE EDUCATION/TRAINING PROGRAM
Payer: COMMERCIAL
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
## 2022-05-04 DIAGNOSIS — T14.90XA BLUNT TRAUMA: ICD-10-CM

## 2022-05-04 PROCEDURE — 70450 CT HEAD/BRAIN W/O DYE: CPT

## 2022-05-04 PROCEDURE — 99285 EMERGENCY DEPT VISIT HI MDM: CPT

## 2022-05-04 PROCEDURE — 72125 CT NECK SPINE W/O DYE: CPT

## 2022-05-04 PROCEDURE — 6360000004 HC RX CONTRAST MEDICATION: Performed by: STUDENT IN AN ORGANIZED HEALTH CARE EDUCATION/TRAINING PROGRAM

## 2022-05-04 RX ORDER — PHENAZOPYRIDINE HYDROCHLORIDE 100 MG/1
100 TABLET, FILM COATED ORAL 2 TIMES DAILY
COMMUNITY

## 2022-05-04 RX ADMIN — IOPAMIDOL 75 ML: 755 INJECTION, SOLUTION INTRAVENOUS at 23:57

## 2022-05-04 ASSESSMENT — PAIN DESCRIPTION - DESCRIPTORS: DESCRIPTORS: ACHING

## 2022-05-04 ASSESSMENT — PAIN DESCRIPTION - PAIN TYPE: TYPE: ACUTE PAIN

## 2022-05-04 ASSESSMENT — PAIN DESCRIPTION - LOCATION: LOCATION: BACK

## 2022-05-04 ASSESSMENT — PAIN - FUNCTIONAL ASSESSMENT: PAIN_FUNCTIONAL_ASSESSMENT: 0-10

## 2022-05-04 ASSESSMENT — PAIN SCALES - GENERAL: PAINLEVEL_OUTOF10: 9

## 2022-05-05 ENCOUNTER — APPOINTMENT (OUTPATIENT)
Dept: GENERAL RADIOLOGY | Age: 47
End: 2022-05-05
Payer: COMMERCIAL

## 2022-05-05 ENCOUNTER — APPOINTMENT (OUTPATIENT)
Dept: CT IMAGING | Age: 47
End: 2022-05-05
Payer: COMMERCIAL

## 2022-05-05 VITALS
DIASTOLIC BLOOD PRESSURE: 90 MMHG | BODY MASS INDEX: 25.04 KG/M2 | HEART RATE: 79 BPM | SYSTOLIC BLOOD PRESSURE: 142 MMHG | RESPIRATION RATE: 16 BRPM | OXYGEN SATURATION: 96 % | WEIGHT: 195 LBS | TEMPERATURE: 98.3 F

## 2022-05-05 LAB
A/G RATIO: 1.6 (ref 1.1–2.2)
ALBUMIN SERPL-MCNC: 3.9 G/DL (ref 3.4–5)
ALP BLD-CCNC: 80 U/L (ref 40–129)
ALT SERPL-CCNC: 14 U/L (ref 10–40)
ANION GAP SERPL CALCULATED.3IONS-SCNC: 9 MMOL/L (ref 3–16)
AST SERPL-CCNC: 15 U/L (ref 15–37)
BASOPHILS ABSOLUTE: 0 K/UL (ref 0–0.2)
BASOPHILS RELATIVE PERCENT: 0.7 %
BILIRUB SERPL-MCNC: 0.4 MG/DL (ref 0–1)
BUN BLDV-MCNC: 18 MG/DL (ref 7–20)
CALCIUM SERPL-MCNC: 8.9 MG/DL (ref 8.3–10.6)
CHLORIDE BLD-SCNC: 103 MMOL/L (ref 99–110)
CO2: 26 MMOL/L (ref 21–32)
CREAT SERPL-MCNC: 0.8 MG/DL (ref 0.9–1.3)
EOSINOPHILS ABSOLUTE: 0.1 K/UL (ref 0–0.6)
EOSINOPHILS RELATIVE PERCENT: 1 %
GFR AFRICAN AMERICAN: >60
GFR NON-AFRICAN AMERICAN: >60
GLUCOSE BLD-MCNC: 94 MG/DL (ref 70–99)
HCT VFR BLD CALC: 40 % (ref 40.5–52.5)
HEMOGLOBIN: 13.5 G/DL (ref 13.5–17.5)
LYMPHOCYTES ABSOLUTE: 2.4 K/UL (ref 1–5.1)
LYMPHOCYTES RELATIVE PERCENT: 35 %
MCH RBC QN AUTO: 29.1 PG (ref 26–34)
MCHC RBC AUTO-ENTMCNC: 33.7 G/DL (ref 31–36)
MCV RBC AUTO: 86.2 FL (ref 80–100)
MONOCYTES ABSOLUTE: 0.8 K/UL (ref 0–1.3)
MONOCYTES RELATIVE PERCENT: 11.7 %
NEUTROPHILS ABSOLUTE: 3.5 K/UL (ref 1.7–7.7)
NEUTROPHILS RELATIVE PERCENT: 51.6 %
PDW BLD-RTO: 13.5 % (ref 12.4–15.4)
PLATELET # BLD: 254 K/UL (ref 135–450)
PMV BLD AUTO: 7 FL (ref 5–10.5)
POTASSIUM REFLEX MAGNESIUM: 3.8 MMOL/L (ref 3.5–5.1)
RBC # BLD: 4.64 M/UL (ref 4.2–5.9)
SODIUM BLD-SCNC: 138 MMOL/L (ref 136–145)
TOTAL PROTEIN: 6.3 G/DL (ref 6.4–8.2)
WBC # BLD: 6.7 K/UL (ref 4–11)

## 2022-05-05 PROCEDURE — 36415 COLL VENOUS BLD VENIPUNCTURE: CPT

## 2022-05-05 PROCEDURE — 80053 COMPREHEN METABOLIC PANEL: CPT

## 2022-05-05 PROCEDURE — 3209999900 CT LUMBAR SPINE TRAUMA RECONSTRUCTION

## 2022-05-05 PROCEDURE — 85025 COMPLETE CBC W/AUTO DIFF WBC: CPT

## 2022-05-05 PROCEDURE — 73502 X-RAY EXAM HIP UNI 2-3 VIEWS: CPT

## 2022-05-05 PROCEDURE — 71260 CT THORAX DX C+: CPT

## 2022-05-05 PROCEDURE — 3209999900 CT THORACIC SPINE TRAUMA RECONSTRUCTION

## 2022-05-05 RX ORDER — OXYCODONE HYDROCHLORIDE 5 MG/1
5 TABLET ORAL ONCE
Status: DISCONTINUED | OUTPATIENT
Start: 2022-05-05 | End: 2022-05-05

## 2022-05-05 NOTE — ED NOTES
Blood work collected and sent to lab     [facility] [place], [address] [place] [address]  05/05/22 0278

## 2022-05-05 NOTE — ED PROVIDER NOTES
Primary Care Physician: No primary care provider on file. Attending Physician: No att. providers found     History   Chief Complaint   Patient presents with   Dalton Vasquez     states he fell and c/o pain all over after falling down 6 steps. MADAN Gatica  is a 41 y.o. male resident of a correctional facility who presents with multiple complaints. Patient stated that he fell 6 times hitting his head and his neck. Now complaining of left hip pain as well as back pain. Complain of neck pain as well. Per reports it seems as this was intentional fall. Reports indicate that patient has done this in the past.    Past Medical History:   Diagnosis Date    Murmur         Past Surgical History:   Procedure Laterality Date    ABLATION OF DYSRHYTHMIC FOCUS      OTHER SURGICAL HISTORY      \"open heart surgery as a kid\"         No family history on file. Social History     Socioeconomic History    Marital status:      Spouse name: Not on file    Number of children: Not on file    Years of education: Not on file    Highest education level: Not on file   Occupational History    Not on file   Tobacco Use    Smoking status: Current Every Day Smoker     Packs/day: 1.00     Types: Cigarettes    Smokeless tobacco: Never Used   Vaping Use    Vaping Use: Never used   Substance and Sexual Activity    Alcohol use: Not Currently    Drug use: Not Currently    Sexual activity: Not on file   Other Topics Concern    Not on file   Social History Narrative    Not on file     Social Determinants of Health     Financial Resource Strain:     Difficulty of Paying Living Expenses: Not on file   Food Insecurity:     Worried About Running Out of Food in the Last Year: Not on file    Mike of Food in the Last Year: Not on file   Transportation Needs:     Lack of Transportation (Medical): Not on file    Lack of Transportation (Non-Medical):  Not on file   Physical Activity:     Days of Exercise per Week: Not on file    Minutes of Exercise per Session: Not on file   Stress:     Feeling of Stress : Not on file   Social Connections:     Frequency of Communication with Friends and Family: Not on file    Frequency of Social Gatherings with Friends and Family: Not on file    Attends Anabaptism Services: Not on file    Active Member of [address] [place] [address] or Organizations: Not on file    Attends Club or Organization Meetings: Not on file    Marital Status: Not on file   Intimate Partner Violence:     Fear of Current or Ex-Partner: Not on file    Emotionally Abused: Not on file    Physically Abused: Not on file    Sexually Abused: Not on file   Housing Stability:     Unable to Pay for Housing in the Last Year: Not on file    Number of [place] in the Last Year: Not on file    Unstable Housing in the Last Year: Not on file        Review of Systems   10 total systems reviewed and found to be negative unless otherwise noted in HPI     Physical Exam   BP (!) 142/90   Pulse 79   Temp 98.3 °F (36.8 °C) (Oral)   Resp 16   Wt 195 lb (88.5 kg)   SpO2 96%   BMI 25.04 kg/m²      CONSTITUTIONAL: Well appearing, in no acute distress   HEAD: atraumatic, normocephalic   EYES: PERRL, No injection, discharge or scleral icterus. ENT: Moist mucous membranes. NECK: Normal ROM, NO LAD   CARDIOVASCULAR: Regular rate and rhythm. No murmurs or gallop. PULMONARY/CHEST: Airway patent. No retractions. Breath sounds clear with good air entry bilaterally. ABDOMEN: Soft, Non-distended and non-tender, without guarding or rebound. SKIN: Acyanotic, warm, dry   MUSCULOSKELETAL: No swelling,or deformity also tender to palpation on the left hip  NEUROLOGICAL: Awake and oriented x 3. Pulses intact. Grossly nonfocal tender to palpation of T and C-spine. Nursing note and vitals reviewed.      ED Course & Medical Decision Making   Medications   iopamidol (ISOVUE-370) 76 % injection 75 mL (75 mLs IntraVENous Given 5/4/22 2185)      Labs Reviewed   CBC WITH AUTO DIFFERENTIAL - Abnormal; Notable for the following components:       Result Value    Hematocrit 40.0 (*)     All other components within normal limits   COMPREHENSIVE METABOLIC PANEL W/ REFLEX TO MG FOR LOW K - Abnormal; Notable for the following components:    CREATININE 0.8 (*)     Total Protein 6.3 (*)     All other components within normal limits      CT THORACIC SPINE TRAUMA RECONSTRUCTION   Final Result   No acute traumatic finding in the chest, abdomen, and pelvis. No acute fracture or subluxation in the thoracolumbar spine. Cholelithiasis. CT LUMBAR SPINE TRAUMA RECONSTRUCTION   Final Result   No acute traumatic finding in the chest, abdomen, and pelvis. No acute fracture or subluxation in the thoracolumbar spine. Cholelithiasis. CT CHEST ABDOMEN PELVIS W CONTRAST   Final Result   No acute traumatic finding in the chest, abdomen, and pelvis. No acute fracture or subluxation in the thoracolumbar spine. Cholelithiasis. XR HIP LEFT (2-3 VIEWS)   Final Result   No acute abnormality seen. CT Cervical Spine WO Contrast   Final Result   No acute abnormality of the cervical spine. CT Head WO Contrast   Final Result   No acute intracranial abnormality. XR CHEST PORTABLE    Result Date: 4/6/2022  EXAMINATION: ONE XRAY VIEW OF THE CHEST 4/6/2022 11:29 am COMPARISON: 03/14/2022 HISTORY: ORDERING SYSTEM PROVIDED HISTORY: CP TECHNOLOGIST PROVIDED HISTORY: Reason for exam:->CP Reason for Exam: Chest Pain (started this morning; ablation 2 months ago for SVT) FINDINGS: The lungs are without acute focal process. There is no effusion or pneumothorax. The cardiomediastinal silhouette is stable. The osseous structures are stable. No acute process. PROCEDURES:   Procedures    ASSESSMENT AND PLAN:  [id number], Jerica Whitehead is a 41 y.o. male who presents after a fall.   On exam patient appears to be in no distress but tender to palpation C and T-spine as well as left knee. Given the mechanism of the injury I did do a CT of the head chest abdomen pelvic as well as lumbar thoracic recall. X-ray of the left femur was obtained. Results showed no abnormal findings. His LFTs and renal function was normal.  At this time I believe that patient is stable no indication for admission discharged back to his correctional facility. ClINICAL IMPRESSION:  1. Fall, initial encounter    2. Blunt trauma          PATIENT REFERRED TO:  Milan Arellano  [phone]  Schedule an appointment as soon as possible for a visit in 2 days        DISCHARGE MEDICATIONS:  Discharge Medication List as of 5/5/2022  1:48 AM        DISCONTINUED MEDICATIONS:  Discharge Medication List as of 5/5/2022  1:48 AM        DISPOSITION Decision To Discharge 05/05/2022 01:46:10 AM  -We have instructed the patient, John Slaughter) to return to the ED or call his PCP if his pain/symptoms worsen. -Findings and recommendations explained to patient. He expressed understanding and agreed with the plan.    ___________________________________________________________________________________  _________________________________________________________________________________________  This record is transcribed utilizing voice recognition technology. There are inherent limitations in this technology. In addition, there may be limitations in editing of this report. If there are any discrepancies, please contact me directly.         Alejandra Donovan MD  05/05/22 8005

## 2022-05-05 NOTE — ED TRIAGE NOTES
Pt arrived from correctional facility after he had a controlled fall down 6 steps, officers said patient had witness fall in which he appeared to brace himself and fall intentionally. They did not witness any LOC. Pt c/o pain all over and is currently being treated for UTI and skin infection. Pt is unsure of the name of antibiotics.